# Patient Record
Sex: FEMALE | Race: WHITE | NOT HISPANIC OR LATINO | Employment: OTHER | URBAN - METROPOLITAN AREA
[De-identification: names, ages, dates, MRNs, and addresses within clinical notes are randomized per-mention and may not be internally consistent; named-entity substitution may affect disease eponyms.]

---

## 2023-12-29 ENCOUNTER — NON-PROVIDER VISIT (OUTPATIENT)
Dept: WOUND CARE | Facility: MEDICAL CENTER | Age: 78
End: 2023-12-29
Attending: FAMILY MEDICINE
Payer: MEDICARE

## 2023-12-29 PROCEDURE — 99211 OFF/OP EST MAY X REQ PHY/QHP: CPT | Mod: 25

## 2023-12-29 PROCEDURE — 97597 DBRDMT OPN WND 1ST 20 CM/<: CPT

## 2023-12-29 RX ORDER — CLINDAMYCIN HYDROCHLORIDE 300 MG/1
CAPSULE ORAL
COMMUNITY
Start: 2023-12-27 | End: 2024-01-15

## 2023-12-29 RX ORDER — OMEGA-3 FATTY ACIDS/FISH OIL 300-1000MG
CAPSULE ORAL
COMMUNITY
End: 2023-12-29

## 2023-12-29 RX ORDER — SILICONE ADHESIVE 1.5" X 3"
1 SHEET (EA) TOPICAL
COMMUNITY

## 2023-12-29 RX ORDER — DIPHENHYDRAMINE HCL 25 MG
CAPSULE ORAL
COMMUNITY

## 2023-12-29 RX ORDER — ETHACRYNIC ACID 25 MG/1
25 TABLET ORAL DAILY
COMMUNITY

## 2023-12-29 RX ORDER — DIGOXIN 125 MCG
1 TABLET ORAL
COMMUNITY

## 2023-12-29 RX ORDER — APIXABAN 5 MG/1
TABLET, FILM COATED ORAL
COMMUNITY

## 2023-12-29 NOTE — PATIENT INSTRUCTIONS
-Keep your wound dressing clean, dry, and intact.    -Change your dressing if it becomes soiled, soaked, or falls off.    -Remove your compression wrap if you have severe pain, severe swelling, numbness, color change, or temperature change in your toes. If you need to remove your compression wrap, do so by unrolling it. Do not cut the compression wrap off to prevent cutting yourself on accident.    -Should you experience any significant changes in your wound(s), such as infection (redness, swelling, localized heat, increased pain, fever > 101 F, chills) or have any questions regarding your home care instructions, please contact the wound center at [phone]. If after hours, contact your primary care physician or go to the hospital emergency room.

## 2023-12-29 NOTE — CERTIFICATION
Non Provider Encounter- Lower Extremity Ulcer    HISTORY OF PRESENT ILLNESS  Wound History:    START OF CARE IN CLINIC: 12/29/2023    REFERRING PROVIDER: Nils Frausto MD (PCP in [place] where pt lives with her )     WOUND ETIOLOGY: venous insufficiency   LOCATION: right medial heel/ankle   HISTORY: Wound appeared approx Oct 2023. Pt feels it was caused by her compression sock and shoe rubbing on the site. She has had prior LE wounds, however historically they have been on the left leg. Having one on the right leg is new for her. She is currently (as of 12/29/23) on a second course of Clindamycin (note: she has several abx allergies) and states the area has improved.    Pertinent Medical History: PVD, afib on eliquis.      ETIOLOGY HISTORY:   Diagnosed: 2010 or earlier.   Vascular Surgeon: name unknown.   Previous vascular procedures: vein treatment in approximately 2010.   Compression: Wears normally, per vascular surgeon. Has relatively new thigh high compression socks.  Varicose Veins: yes  History LE wounds: yes        TOBACCO USE: none      MOST RECENT VASCULAR STUDIES:   Not in Epic.     Culture:  Pt reports wound culture taken by PCP in [place]. No results available in Epic.       Patient allergies and medications reviewed via Epic.     WOUND ASSESSMENT     Wound 12/29/23 Ankle;Heel Medial Right (Active)   Wound Image    12/29/23 1000   Site Assessment Red;Pink;Yellow;White 12/29/23 1000   Periwound Assessment Hemosiderin Staining;Crusted;Edema 12/29/23 1000   Margins Undefined edges 12/29/23 1000   Closure Secondary intention 12/29/23 1000   Drainage Amount Moderate 12/29/23 1000   Drainage Description Serous 12/29/23 1000   Treatments Cleansed;Topical Lidocaine;CSWD - Conservative Sharp Wound Debridement 12/29/23 1000   Wound Cleansing Hypochlorus Acid 12/29/23 1000   Periwound Protectant Barrier Paste;Skin Moisturizer 12/29/23 1000   Dressing Changed New 12/29/23 1000   Dressing  Cleansing/Solutions Not Applicable 12/29/23 1000   Dressing Options Hydrofiber Silver x2;Super Absorbent Pad (convamax 4x4);Compression Wrap Two Layer; heel padded with 4x4 gauze x2 layers, per pt request. 12/29/23 1000   Dressing Change/Treatment Frequency Weekly, and As Needed 12/29/23 1000   Wound Team Following Weekly 12/29/23 1000   Wound Length (cm) 2.5 cm 12/29/23 1000   Wound Width (cm) 4.2 cm 12/29/23 1000   Wound Depth (cm) 0.1 cm 12/29/23 1000   Wound Surface Area (cm^2) 10.5 cm^2 12/29/23 1000   Wound Volume (cm^3) 1.05 cm^3 12/29/23 1000   Post-Procedure Length (cm) 2.2 cm 12/29/23 1000   Post-Procedure Width (cm) 3 cm 12/29/23 1000   Post-Procedure Depth (cm) 0.2 cm 12/29/23 1000   Post-Procedure Surface Area (cm^2) 6.6 cm^2 12/29/23 1000   Post-Procedure Volume (cm^3) 1.32 cm^3 12/29/23 1000   Tunneling (cm) 0 cm 12/29/23 1000   Undermining (cm) 0 cm 12/29/23 1000   Wound Odor None 12/29/23 1000   Pulses Right;Left;DP;PT;Biphasic per doppler. 12/29/23 1000   Exposed Structures None 12/29/23 1000        Procedures:      -2% viscous lidocaine applied topically to wound bed for >5 minutes prior to debridement  -Scalpel used to debride wound bed. Entire surface of wound, 5cm2 debrided.   -Refer to flowsheet for wound care details.       PATIENT EDUCATION:  - Etiology of venous ulceration discussed with patient  - Importance of managing edema for healing of ulcer, and for prevention of new ulcer development  -Need for lifelong compression of lower legs   -Elevate legs above the level of the heart periodically throughout the day.  - Importance of adequate nutrition for wound healing  -Increase protein intake (unless contraindicated by renal status)  -Discussed importance of smoking cessation.  -Advised to go to ER for any increased redness, swelling, drainage or odor, or if patient develops fever, chills, nausea or vomiting.

## 2024-01-05 ENCOUNTER — OFFICE VISIT (OUTPATIENT)
Dept: WOUND CARE | Facility: MEDICAL CENTER | Age: 79
End: 2024-01-05
Attending: FAMILY MEDICINE
Payer: MEDICARE

## 2024-01-05 VITALS
TEMPERATURE: 97.7 F | DIASTOLIC BLOOD PRESSURE: 70 MMHG | HEART RATE: 55 BPM | RESPIRATION RATE: 17 BRPM | OXYGEN SATURATION: 93 % | SYSTOLIC BLOOD PRESSURE: 118 MMHG

## 2024-01-05 DIAGNOSIS — I83.013 VENOUS STASIS ULCER OF RIGHT ANKLE WITH FAT LAYER EXPOSED WITH VARICOSE VEINS (HCC): ICD-10-CM

## 2024-01-05 DIAGNOSIS — R60.0 EDEMA OF BOTH LOWER LEGS: ICD-10-CM

## 2024-01-05 DIAGNOSIS — R52 PAIN ASSOCIATED WITH WOUND: ICD-10-CM

## 2024-01-05 DIAGNOSIS — T14.8XXA PAIN ASSOCIATED WITH WOUND: ICD-10-CM

## 2024-01-05 DIAGNOSIS — L97.312 VENOUS STASIS ULCER OF RIGHT ANKLE WITH FAT LAYER EXPOSED WITH VARICOSE VEINS (HCC): ICD-10-CM

## 2024-01-05 PROCEDURE — 99214 OFFICE O/P EST MOD 30 MIN: CPT

## 2024-01-05 PROCEDURE — 99213 OFFICE O/P EST LOW 20 MIN: CPT

## 2024-01-05 PROCEDURE — 99213 OFFICE O/P EST LOW 20 MIN: CPT | Mod: 25 | Performed by: NURSE PRACTITIONER

## 2024-01-05 PROCEDURE — 11042 DBRDMT SUBQ TIS 1ST 20SQCM/<: CPT

## 2024-01-05 PROCEDURE — 11042 DBRDMT SUBQ TIS 1ST 20SQCM/<: CPT | Performed by: NURSE PRACTITIONER

## 2024-01-05 PROCEDURE — 3078F DIAST BP <80 MM HG: CPT | Performed by: NURSE PRACTITIONER

## 2024-01-05 PROCEDURE — 3074F SYST BP LT 130 MM HG: CPT | Performed by: NURSE PRACTITIONER

## 2024-01-05 ASSESSMENT — ENCOUNTER SYMPTOMS
DIARRHEA: 0
CONSTIPATION: 0
COUGH: 0
VOMITING: 0
CLAUDICATION: 0
SHORTNESS OF BREATH: 0
NAUSEA: 0
FEVER: 0
CHILLS: 0

## 2024-01-05 NOTE — PROGRESS NOTES
Verse order for Solaris Ready Wrap:     Wound 12/29/23 Ankle;Heel Medial Right (Active)   Wound Image    01/05/24 0900   Site Assessment Pink;Red;Yellow;Crusted 01/05/24 0900   Periwound Assessment Hemosiderin Staining;Crusted;Edema 01/05/24 0900   Margins Undefined edges 01/05/24 0900   Closure Secondary intention 01/05/24 0900   Drainage Amount Moderate 01/05/24 0900   Drainage Description Serous 01/05/24 0900   Treatments Cleansed;Topical Lidocaine;Provider debridement;Site care 01/05/24 0900   Wound Cleansing Hypochlorus Acid 01/05/24 0900   Periwound Protectant Skin Moisturizer;Barrier Paste 01/05/24 0900   Dressing Changed Changed 01/05/24 0900   Dressing Cleansing/Solutions Not Applicable 01/05/24 0900   Dressing Options Hydrofiber Silver;Super Absorbent Pad;Compression Wrap Two Layer;Dry Gauze 01/05/24 0900   Dressing Change/Treatment Frequency Weekly, and As Needed 01/05/24 0900   Wound Team Following Weekly 01/05/24 0900   Wound Length (cm) 2.7 cm 01/05/24 0900   Wound Width (cm) 3 cm 01/05/24 0900   Wound Depth (cm) 0.1 cm 01/05/24 0900   Wound Surface Area (cm^2) 8.1 cm^2 01/05/24 0900   Wound Volume (cm^3) 0.81 cm^3 01/05/24 0900   Post-Procedure Length (cm) 3 cm 01/05/24 0900   Post-Procedure Width (cm) 3 cm 01/05/24 0900   Post-Procedure Depth (cm) 0.1 cm 01/05/24 0900   Post-Procedure Surface Area (cm^2) 9 cm^2 01/05/24 0900   Post-Procedure Volume (cm^3) 0.9 cm^3 01/05/24 0900   Wound Healing % 23 01/05/24 0900   Tunneling (cm) 0 cm 01/05/24 0900   Undermining (cm) 0 cm 01/05/24 0900   Wound Odor None 01/05/24 0900   Pulses Right;Left;DP;PT;Doppler;Other (Comments) 12/29/23 1000   Exposed Structures None 01/05/24 0900

## 2024-01-05 NOTE — PROGRESS NOTES
Provider Encounter- Lower Extremity Ulcer      HISTORY OF PRESENT ILLNESS  Wound History:    START OF CARE IN CLINIC: 12/29/2023               REFERRING PROVIDER: Nils Frausto MD                  WOUND ETIOLOGY: venous insufficiency              LOCATION: right medial heel/ankle              HISTORY: Patient with long history of CVI referred to the clinic for treatment of a right medial ankle ulcer.  This wound started in October of this year, patient feels this was caused by rubbing from her sock and shoes.  She has a history of similar ulcers to her left lower extremity.  She has undergone multiple previous venous procedures, including ablations, to both legs in the past, has been seen at [facility].   Her PCP prescribed 2 courses of clindamycin.           Pertinent Medical History: Chronic venous insufficiency, atrial fibrillation      ETIOLOGY HISTORY: Diagnosed unknown. Vascular Surgeon: Dr. Goldstein. Previous vascular procedures ablation procedures. Compression stockings 20 to 40 mmHg. Varicose Veins present        TOBACCO USE: Former smoker, quit in 1972    Patient's problem list, allergies, and current medications reviewed and updated in Epic    Interval History:  1/5/2024 : Initial provider visit with ADRIANA Chi, KIMBER-BC, CWMATEON, CFNILAY.  Patient is here today with her .  She states that overall she is feeling well.  Tolerating compression wrap without any difficulty.  Wound measures smaller today.  She has completed her antibiotics.      REVIEW OF SYSTEMS:   Review of Systems   Constitutional:  Negative for chills and fever.   Respiratory:  Negative for cough and shortness of breath.    Cardiovascular:  Positive for leg swelling. Negative for chest pain and claudication.        Problems with swelling in her legs for many years   Gastrointestinal:  Negative for constipation, diarrhea, nausea and vomiting.   Genitourinary:  Negative for dysuria.         PHYSICAL EXAMINATION:   /70    Pulse (!) 55   Temp 36.5 °C (97.7 °F) (Temporal)   Resp 17   SpO2 93%     Physical Exam  Cardiovascular:      Rate and Rhythm: Bradycardia present.      Comments: Right pedal pulses difficult to palpate, biphasic by Doppler  Pulmonary:      Effort: Pulmonary effort is normal.   Musculoskeletal:      Right lower leg: Edema present.      Left lower leg: Edema present.      Comments: Compression stocking to left lower extremity  Edema to right lower extremity 1+, well-controlled with compression wrap   Skin:     Comments: Full-thickness ulcer to right medial ankle-edges diffuse, total wound area has decreased since last assessment, moderate serosanguineous drainage, no evidence of infection   Neurological:      Mental Status: She is alert and oriented to person, place, and time.   Psychiatric:         Mood and Affect: Mood normal.         WOUND ASSESSMENT  Wound 12/29/23 Ankle;Heel Medial Right (Active)   Wound Image    01/05/24 0900   Site Assessment Pink;Red;Yellow;Crusted 01/05/24 0900   Periwound Assessment Hemosiderin Staining;Crusted;Edema 01/05/24 0900   Margins Undefined edges 01/05/24 0900   Closure Secondary intention 01/05/24 0900   Drainage Amount Moderate 01/05/24 0900   Drainage Description Serous 01/05/24 0900   Treatments Cleansed;Topical Lidocaine;Provider debridement;Site care 01/05/24 0900   Wound Cleansing Hypochlorus Acid 01/05/24 0900   Periwound Protectant Skin Moisturizer;Barrier Paste 01/05/24 0900   Dressing Changed Changed 01/05/24 0900   Dressing Cleansing/Solutions Not Applicable 01/05/24 0900   Dressing Options Hydrofiber Silver;Super Absorbent Pad;Compression Wrap Two Layer;Dry Gauze 01/05/24 0900   Dressing Change/Treatment Frequency Weekly, and As Needed 01/05/24 0900   Wound Team Following Weekly 01/05/24 0900   Wound Length (cm) 2.7 cm 01/05/24 0900   Wound Width (cm) 3 cm 01/05/24 0900   Wound Depth (cm) 0.1 cm 01/05/24 0900   Wound Surface Area (cm^2) 8.1 cm^2 01/05/24 0900  "  Wound Volume (cm^3) 0.81 cm^3 01/05/24 0900   Post-Procedure Length (cm) 3 cm 01/05/24 0900   Post-Procedure Width (cm) 3 cm 01/05/24 0900   Post-Procedure Depth (cm) 0.1 cm 01/05/24 0900   Post-Procedure Surface Area (cm^2) 9 cm^2 01/05/24 0900   Post-Procedure Volume (cm^3) 0.9 cm^3 01/05/24 0900   Wound Healing % 23 01/05/24 0900   Tunneling (cm) 0 cm 01/05/24 0900   Undermining (cm) 0 cm 01/05/24 0900   Wound Odor None 01/05/24 0900   Pulses Right;Left;DP;PT;Doppler;Other (Comments) 12/29/23 1000   Exposed Structures None 01/05/24 0900   Number of days: 7           PROCEDURE:   -2% viscous lidocaine applied topically to wound bed for approximately 5 minutes prior to debridement  -Curette used to excise nonviable tissue from wound bed.  Excisional debridement was performed to remove devitalized tissue until healthy, bleeding tissue was visualized.   Entire surface of wound, 9.0 cm² debrided.  Tissue debrided into the subcutaneous layer.    -Bleeding controlled with manual pressure.   -Wound care completed by wound RN, refer to wound flowsheet   -Patient tolerated the procedure well, without c/o of significant pain or discomfort.       Pertinent Labs and Diagnostics:    Labs:   A1c:   Lab Results   Component Value Date/Time    HBA1C 5.9 (H) 04/26/2023 09:34 AM            IMAGING: None found in epic    VASCULAR STUDIES: None found in epic  No results found.    LAST  WOUND CULTURE:  DATE :  No results found for: \"CULTRSULT\"           ASSESSMENT AND PLAN:     1. Venous stasis ulcer of right ankle with fat layer exposed with varicose veins (HCC)    1/5/2024: Initial provider visit.  Wound area has decreased in size assessment.  Patient tolerating compression wrap.  -Excisional debridement of wound in clinic today, medically necessary to promote wound healing.  -Patient to return to clinic weekly for assessment and debridement  -Patient to keep compression wrap dry in between clinic visits   -Patient measured for " Solaris wrap today.  Appropriate for long-term management of edema.  Patient has had trouble applying and removing compression socks.    Wound care: Silver Hydrofiber to manage exudate and bioburden, ABD pad, 2 layer compression      2. Pain associated with wound    1/5/2024: Patient was able to tolerate significant debridement today with use of topical anesthesia  -2% viscous lidocaine applied topically to wound bed for approximately 5 minutes prior to debridement  -Patient tolerated procedure today with no complaints of discomfort.     3. Edema of both lower legs    1/5/2024: Patient's right lower extremity edema is well-controlled with 2 layer compression wrap.  She states that in the past she has had trouble with compression stockings to this leg, difficulty applying and removing.  -Continue with compression wrap for now  -Patient measured for Solaris wrap, instructed to bring with her to clinic when she receives it to be shown how to apply properly.          PATIENT EDUCATION  - Etiology of  venous stasis ulceration discussed with patient  - Importance of managing edema for healing of ulcer, and for prevention of new ulcer development  -Need for lifelong compression of lower legs   -Elevate legs above the level of the heart periodically throughout the day.  - Importance of adequate nutrition for wound healing  -Advised to go to ER for any increased redness, swelling, drainage or odor, or if patient develops fever, chills, nausea or vomiting.      My total time spent caring for the patient on the day of the encounter was 20 minutes.   This does not include time spent on separately billable procedures/tests.       Please note that this note may have been created using voice recognition software. I have worked with technical experts [facility] to optimize the interface.  I have made every reasonable attempt to correct obvious errors, but there may be errors of grammar and possibly     N

## 2024-01-05 NOTE — PATIENT INSTRUCTIONS
-Keep your wound dressing clean, dry, and intact.    -Change your dressing if it becomes soiled, soaked, or falls off.    -Remove your compression wrap if you have severe pain, severe swelling, numbness, color change, or temperature change in your toes. If you need to remove your compression wrap, do so by unrolling it. Do not cut the compression wrap off to prevent cutting yourself on accident.    -Should you experience any significant changes in your wound(s), such as infection (redness, swelling, localized heat, increased pain, fever > 101 F, chills) or have any questions regarding your home care instructions, please contact the wound center at [phone]. If after hours, contact your primary care physician or go to the hospital emergency room.

## 2024-01-05 NOTE — PROGRESS NOTES
[facility]olaris [facility]

## 2024-01-15 ENCOUNTER — OFFICE VISIT (OUTPATIENT)
Dept: WOUND CARE | Facility: MEDICAL CENTER | Age: 79
End: 2024-01-15
Attending: FAMILY MEDICINE
Payer: MEDICARE

## 2024-01-15 VITALS
SYSTOLIC BLOOD PRESSURE: 145 MMHG | RESPIRATION RATE: 16 BRPM | HEART RATE: 67 BPM | DIASTOLIC BLOOD PRESSURE: 90 MMHG | OXYGEN SATURATION: 96 % | TEMPERATURE: 96.6 F

## 2024-01-15 DIAGNOSIS — L97.312 VENOUS STASIS ULCER OF RIGHT ANKLE WITH FAT LAYER EXPOSED WITH VARICOSE VEINS (HCC): ICD-10-CM

## 2024-01-15 DIAGNOSIS — R52 PAIN ASSOCIATED WITH WOUND: ICD-10-CM

## 2024-01-15 DIAGNOSIS — T14.8XXA PAIN ASSOCIATED WITH WOUND: ICD-10-CM

## 2024-01-15 DIAGNOSIS — R60.0 EDEMA OF BOTH LOWER LEGS: ICD-10-CM

## 2024-01-15 DIAGNOSIS — B35.1 MYCOTIC TOENAILS: ICD-10-CM

## 2024-01-15 DIAGNOSIS — M20.42 HAMMERTOES OF BOTH FEET: ICD-10-CM

## 2024-01-15 DIAGNOSIS — M20.41 HAMMERTOES OF BOTH FEET: ICD-10-CM

## 2024-01-15 DIAGNOSIS — I83.013 VENOUS STASIS ULCER OF RIGHT ANKLE WITH FAT LAYER EXPOSED WITH VARICOSE VEINS (HCC): ICD-10-CM

## 2024-01-15 PROCEDURE — 3080F DIAST BP >= 90 MM HG: CPT | Performed by: NURSE PRACTITIONER

## 2024-01-15 PROCEDURE — 3077F SYST BP >= 140 MM HG: CPT | Performed by: NURSE PRACTITIONER

## 2024-01-15 PROCEDURE — 11042 DBRDMT SUBQ TIS 1ST 20SQCM/<: CPT | Performed by: NURSE PRACTITIONER

## 2024-01-15 PROCEDURE — 99213 OFFICE O/P EST LOW 20 MIN: CPT | Mod: 25 | Performed by: NURSE PRACTITIONER

## 2024-01-15 PROCEDURE — 11042 DBRDMT SUBQ TIS 1ST 20SQCM/<: CPT

## 2024-01-15 PROCEDURE — 99213 OFFICE O/P EST LOW 20 MIN: CPT | Mod: 25

## 2024-01-15 NOTE — PROGRESS NOTES
Patient has not received her Solaris Wrap from [employer]. Inquired for an update from [employer] and this is what was received:     Hello. We received your request for an update for Vanessa's order. We apologize that the tracking wasn't updated on this order, but that oversight has been corrected. The package is scheduled to arrive on Wednesday. Delivery for this order may be taking longer than usual because the patient has a [address] address. Please let us know if you have any other questions.    Patient was advised to bring in to clinic next visit so that we may show her how to apply.

## 2024-01-15 NOTE — PROGRESS NOTES
Provider Encounter- Lower Extremity Ulcer      HISTORY OF PRESENT ILLNESS  Wound History:    START OF CARE IN CLINIC: 12/29/2023               REFERRING PROVIDER: Nils Frausto MD                  WOUND ETIOLOGY: venous insufficiency              LOCATION: right medial heel/ankle              HISTORY: Patient with long history of CVI referred to the clinic for treatment of a right medial ankle ulcer.  This wound started in October of this year, patient feels this was caused by rubbing from her sock and shoes.  She has a history of similar ulcers to her left lower extremity.  She has undergone multiple previous venous procedures, including ablations, to both legs in the past, has been seen at [facility].   Her PCP prescribed 2 courses of clindamycin.           Pertinent Medical History: Chronic venous insufficiency, atrial fibrillation      ETIOLOGY HISTORY: Diagnosed unknown. Vascular Surgeon: Dr. Goldstein. Previous vascular procedures ablation procedures. Compression stockings 20 to 40 mmHg. Varicose Veins present        TOBACCO USE: Former smoker, quit in 1972    Patient's problem list, allergies, and current medications reviewed and updated in Epic    Interval History:  1/5/2024 : Initial provider visit with ADRIANA Chi, RYAN, MARCIA, JUANPABLO.  Patient is here today with her .  She states that overall she is feeling well.  Tolerating compression wrap without any difficulty.  Wound measures smaller today.  She has completed her antibiotics.      1/15/2024: Clinic visit with Claudia WRIGHT, RYAN, WILVER, CFNILAY.  Pt denies fevers, chills, nausea, vomiting.  Has not received Solaris wrap for right leg.  Followed [facility].  Has had history of ablation to right thigh and knee but never to right ankle.  Ulcer has decreased in size to right medial ankle.  Hammertoes bilaterally with mycotic overgrown toenails with callusing.  Referral to podiatry placed.  Discussed with patient  following up with local orthotist for custom inserts.  Patient does not have diabetes.      REVIEW OF SYSTEMS:   Unchanged from previous wound clinic assessment on 1/5/24, except as noted in interval history above        PHYSICAL EXAMINATION:   BP (!) 145/90   Pulse 67   Temp 35.9 °C (96.6 °F) (Temporal)   Resp 16   SpO2 96%     Physical Exam  Cardiovascular:      Rate and Rhythm: Bradycardia present.      Comments: Right pedal pulses difficult to palpate, biphasic by Doppler  Pulmonary:      Effort: Pulmonary effort is normal.   Musculoskeletal:      Right lower leg: Edema present.      Left lower leg: Edema present.      Comments: Compression stocking to left lower extremity  Edema to right lower extremity 1+, well-controlled with compression wrap   Skin:     Comments: Full-thickness ulcer to right medial ankle-edges diffuse, total wound area has decreased since last assessment, moderate serosanguineous drainage, no evidence of infection   Neurological:      Mental Status: She is alert and oriented to person, place, and time.   Psychiatric:         Mood and Affect: Mood normal.         WOUND ASSESSMENT  Wound 12/29/23 Ankle;Heel Medial Right (Active)   Wound Image    01/15/24 1030   Site Assessment Pink;Red;Crusted 01/15/24 1030   Periwound Assessment Hemosiderin Staining;Crusted;Edema 01/15/24 1030   Margins Undefined edges 01/15/24 1030   Closure Secondary intention 01/05/24 0900   Drainage Amount Small 01/15/24 1030   Drainage Description Serosanguineous 01/15/24 1030   Treatments Topical Lidocaine;Cleansed;Site care;Provider debridement 01/15/24 1030   Wound Cleansing Hypochlorus Acid 01/15/24 1030   Periwound Protectant Skin Moisturizer;Barrier Paste 01/15/24 1030   Dressing Changed Changed 01/15/24 1030   Dressing Cleansing/Solutions Not Applicable 01/15/24 1030   Dressing Options Hydrofiber Silver;Nonadhesive Foam;Compression Wrap Two Layer;Dry Gauze 01/15/24 1030   Dressing Change/Treatment Frequency  "Weekly, and As Needed 01/15/24 1030   Wound Team Following Weekly 01/15/24 1030   Wound Length (cm) 0.5 cm 01/15/24 1030   Wound Width (cm) 2 cm 01/15/24 1030   Wound Depth (cm) 0.1 cm 01/15/24 1030   Wound Surface Area (cm^2) 1 cm^2 01/15/24 1030   Wound Volume (cm^3) 0.1 cm^3 01/15/24 1030   Post-Procedure Length (cm) 0.5 cm 01/15/24 1030   Post-Procedure Width (cm) 2.4 cm 01/15/24 1030   Post-Procedure Depth (cm) 0.2 cm 01/15/24 1030   Post-Procedure Surface Area (cm^2) 1.2 cm^2 01/15/24 1030   Post-Procedure Volume (cm^3) 0.24 cm^3 01/15/24 1030   Wound Healing % 90 01/15/24 1030   Tunneling (cm) 0 cm 01/15/24 1030   Undermining (cm) 0 cm 01/15/24 1030   Wound Odor None 01/15/24 1030   Pulses Right;Left;DP;PT;Doppler;Other (Comments) 12/29/23 1000   Exposed Structures None 01/15/24 1030   Number of days: 20           PROCEDURE:   -2% viscous lidocaine applied topically to wound bed for approximately 5 minutes prior to debridement  -Curette used to excise nonviable tissue from wound bed.  Excisional debridement was performed to remove devitalized tissue until healthy, bleeding tissue was visualized.   Entire surface of wound, 1.2 cm² debrided.  Tissue debrided into the subcutaneous layer.    -Bleeding controlled with manual pressure.   -Wound care completed by wound RN, refer to wound flowsheet   -Patient tolerated the procedure well, without c/o of significant pain or discomfort.       Pertinent Labs and Diagnostics:    Labs:   A1c:   Lab Results   Component Value Date/Time    HBA1C 5.9 (H) 04/26/2023 09:34 AM            IMAGING: None found in epic    VASCULAR STUDIES: None found in epic  No results found.    LAST  WOUND CULTURE:  DATE :  No results found for: \"CULTRSULT\"           ASSESSMENT AND PLAN:     1. Venous stasis ulcer of right ankle with fat layer exposed with varicose veins (HCC)    1/15/2024:   Wound area has decreased in size.  Patient tolerating compression wrap.  -Excisional debridement of wound " in clinic today, medically necessary to promote wound healing.  -Patient to return to clinic weekly for assessment and debridement  -Patient to keep compression wrap dry in between clinic visits   -Patient has not received Solaris ready wrap.  RN followed up and reports that wrap is in route to be delivered to home.  Appropriate for long-term management of edema.  Patient has had trouble applying and removing compression socks.    Wound care: Silver Hydrofiber to manage exudate and bioburden, ABD pad, 2 layer compression      2. Pain associated with wound    1 /15/24: Patient was able to tolerate significant debridement today with use of topical anesthesia  -2% viscous lidocaine applied topically to wound bed for approximately 5 minutes prior to debridement  -Patient tolerated procedure today with no complaints of discomfort.     3. Edema of both lower legs    1/15/2024: Patient's right lower extremity edema is well-controlled with 2 layer compression wrap.  She states that in the past she has had trouble with compression stockings to this leg, difficulty applying and removing.  -Continue with compression wrap for now  -Solaris wrap to be delivered to home soon, instructed to bring with her to clinic when she receives it to be shown how to apply properly.    4.  Mycotic toenails  Hammertoes of both feet  1/15/2024: Referral to podiatry placed  - Patient encouraged to follow-up with /orthotist for custom inserts.  Unfortunately insurance will not cover as patient does not have diabetes.          PATIENT EDUCATION  - Etiology of  venous stasis ulceration discussed with patient  - Importance of managing edema for healing of ulcer, and for prevention of new ulcer development  -Need for lifelong compression of lower legs   -Elevate legs above the level of the heart periodically throughout the day.  - Importance of adequate nutrition for wound healing  -Advised to go to ER for any increased redness, swelling,  drainage or odor, or if patient develops fever, chills, nausea or vomiting.      My total time spent caring for the patient on the day of the encounter was 20 minutes.   This does not include time spent on separately billable procedures/tests.       Please note that this note may have been created using voice recognition software. I have worked with technical experts from [employer] [facility] to optimize the interface.  I have made every reasonable attempt to correct obvious errors, but there may be errors of grammar and possibly     N

## 2024-01-15 NOTE — PATIENT INSTRUCTIONS
-Keep your wound dressing clean, dry, and intact.    -Change your dressing if it becomes soiled, soaked, or falls off.    -Remove your compression wrap if you have severe pain, severe swelling, numbness, color change, or temperature change in your toes. If you need to remove your compression wrap, do so by unrolling it. Do not cut the compression wrap off to prevent cutting yourself on accident.    -Should you experience any significant changes in your wound(s), such as infection (redness, swelling, localized heat, increased pain, fever > 101 F, chills) or have any questions regarding your home care instructions, please contact the wound center at [phone]. If after hours, contact your primary care physician or go to the hospital emergency room.

## 2024-01-22 ENCOUNTER — OFFICE VISIT (OUTPATIENT)
Dept: WOUND CARE | Facility: MEDICAL CENTER | Age: 79
End: 2024-01-22
Attending: FAMILY MEDICINE
Payer: MEDICARE

## 2024-01-22 VITALS
RESPIRATION RATE: 18 BRPM | HEART RATE: 51 BPM | DIASTOLIC BLOOD PRESSURE: 75 MMHG | SYSTOLIC BLOOD PRESSURE: 118 MMHG | OXYGEN SATURATION: 93 % | TEMPERATURE: 96.8 F

## 2024-01-22 DIAGNOSIS — R52 PAIN ASSOCIATED WITH WOUND: ICD-10-CM

## 2024-01-22 DIAGNOSIS — L97.312 VENOUS STASIS ULCER OF RIGHT ANKLE WITH FAT LAYER EXPOSED WITH VARICOSE VEINS (HCC): ICD-10-CM

## 2024-01-22 DIAGNOSIS — I83.013 VENOUS STASIS ULCER OF RIGHT ANKLE WITH FAT LAYER EXPOSED WITH VARICOSE VEINS (HCC): ICD-10-CM

## 2024-01-22 DIAGNOSIS — T14.8XXA PAIN ASSOCIATED WITH WOUND: ICD-10-CM

## 2024-01-22 DIAGNOSIS — R60.0 EDEMA OF BOTH LOWER LEGS: ICD-10-CM

## 2024-01-22 DIAGNOSIS — M20.42 HAMMERTOES OF BOTH FEET: ICD-10-CM

## 2024-01-22 DIAGNOSIS — M20.41 HAMMERTOES OF BOTH FEET: ICD-10-CM

## 2024-01-22 DIAGNOSIS — B35.1 MYCOTIC TOENAILS: ICD-10-CM

## 2024-01-22 PROCEDURE — 11042 DBRDMT SUBQ TIS 1ST 20SQCM/<: CPT

## 2024-01-22 PROCEDURE — 3074F SYST BP LT 130 MM HG: CPT | Performed by: NURSE PRACTITIONER

## 2024-01-22 PROCEDURE — 3078F DIAST BP <80 MM HG: CPT | Performed by: NURSE PRACTITIONER

## 2024-01-22 PROCEDURE — 11042 DBRDMT SUBQ TIS 1ST 20SQCM/<: CPT | Performed by: NURSE PRACTITIONER

## 2024-01-22 NOTE — PROGRESS NOTES
Order sent to [facility] for supplies. Showed patient and  how to apply the Solaris wrap. Questions and concerns addressed and answered.

## 2024-01-22 NOTE — PATIENT INSTRUCTIONS
-Keep your wound dressing clean, dry, and intact.    -Change your dressing if it becomes soiled, soaked, or falls off.    -Should you experience any significant changes in your wound(s), such as infection (redness, swelling, localized heat, increased pain, fever > 101 F, chills) or have any questions regarding your home care instructions, please contact the wound center at [phone]. If after hours, contact your primary care physician or go to the hospital emergency room.

## 2024-01-22 NOTE — PROGRESS NOTES
Advanced Wound Care   [facility]   [address]   [address]   [place] [address]   [phone] Fax: [phone]        Duration of Supply Order: 30 Days  Dispense as written.     Wound 12/29/23 Ankle;Heel Medial Right (Active)   Wound Image    01/22/24 1030   Site Assessment Pink;Epithelialization;Painful 01/22/24 1030   Periwound Assessment Hemosiderin Staining;Crusted;Edema;Painful 01/22/24 1030   Margins Undefined edges 01/22/24 1030   Closure Secondary intention 01/05/24 0900   Drainage Amount Small 01/22/24 1030   Drainage Description Serosanguineous 01/22/24 1030   Treatments Topical Lidocaine;Cleansed;Site care;Provider debridement 01/22/24 1030   Wound Cleansing Hypochlorus Acid 01/22/24 1030   Periwound Protectant Skin Moisturizer 01/22/24 1030   Dressing Changed Changed 01/22/24 1030   Dressing Cleansing/Solutions Not Applicable 01/22/24 1030   Dressing Options Hydrofiber Silver;Silicone Adhesive Foam 01/22/24 1030   Dressing Change/Treatment Frequency Weekly, and As Needed 01/22/24 1030   Wound Team Following Weekly 01/22/24 1030   Wound Length (cm) 0.5 cm 01/22/24 1030   Wound Width (cm) 0.5 cm 01/22/24 1030   Wound Depth (cm) 0.1 cm 01/22/24 1030   Wound Surface Area (cm^2) 0.25 cm^2 01/22/24 1030   Wound Volume (cm^3) 0.025 cm^3 01/22/24 1030   Post-Procedure Length (cm) 0.7 cm 01/22/24 1030   Post-Procedure Width (cm) 2.3 cm 01/22/24 1030   Post-Procedure Depth (cm) 0.1 cm 01/22/24 1030   Post-Procedure Surface Area (cm^2) 1.61 cm^2 01/22/24 1030   Post-Procedure Volume (cm^3) 0.161 cm^3 01/22/24 1030   Wound Healing % 98 01/22/24 1030   Tunneling (cm) 0 cm 01/22/24 1030   Undermining (cm) 0 cm 01/22/24 1030   Wound Odor None 01/22/24 1030   Pulses Right;Left;DP;PT;Doppler;Other (Comments) 12/29/23 1000   Exposed Structures None 01/22/24 1030       PROCEDURE: CSWD using curette to remove ~1 to 2cm2 nonviable tissue from **location** wound bed. 2% topical  Lidocaine applied prior to debridement with ~5 minute dwell time. Patient tolerated well; denied pain.    I agree with current plan of care.    SIGNATURE:_______________________________________ DATE:________________    PROVIDER NAME:__________________________________

## 2024-01-22 NOTE — PROGRESS NOTES
Provider Encounter- Lower Extremity Ulcer      HISTORY OF PRESENT ILLNESS  Wound History:    START OF CARE IN CLINIC: 12/29/2023               REFERRING PROVIDER: Nils Frausto MD                  WOUND ETIOLOGY: venous insufficiency              LOCATION: right medial heel/ankle              HISTORY: Patient with long history of CVI referred to the clinic for treatment of a right medial ankle ulcer.  This wound started in October of this year, patient feels this was caused by rubbing from her sock and shoes.  She has a history of similar ulcers to her left lower extremity.  She has undergone multiple previous venous procedures, including ablations, to both legs in the past, has been seen at [facility].   Her PCP prescribed 2 courses of clindamycin.           Pertinent Medical History: Chronic venous insufficiency, atrial fibrillation      ETIOLOGY HISTORY: Diagnosed unknown. Vascular Surgeon: Dr. Goldstein. Previous vascular procedures ablation procedures. Compression stockings 20 to 40 mmHg. Varicose Veins present        TOBACCO USE: Former smoker, quit in 1972    Patient's problem list, allergies, and current medications reviewed and updated in Epic    Interval History:  1/5/2024 : Initial provider visit with ADRIANA Chi, RYAN, MARCIA, JUANPABLO.  Patient is here today with her .  She states that overall she is feeling well.  Tolerating compression wrap without any difficulty.  Wound measures smaller today.  She has completed her antibiotics.      1/15/2024: Clinic visit with Claudia WRIGHT, RYAN, WILVER, CFNILAY.  Pt denies fevers, chills, nausea, vomiting.  Has not received Solaris wrap for right leg.  Followed by [facility].  Has had history of ablation to right thigh and knee but never to right ankle.  Ulcer has decreased in size to right medial ankle.  Hammertoes bilaterally with mycotic overgrown toenails with callusing.  Referral to podiatry placed.  Discussed with patient  following up with local orthotist for custom inserts.  Patient does not have diabetes.    1/22/2024:  Clinic visit with Claudia WRIGHT, KIMBER-BC, WILVER, CFNILAY.  Pt denies fevers, chills, nausea, vomiting.  Accompanied by .  Ulcer has decreased in size.  Solaris wrap at bedside.  Will transition patient into Solaris compression wrap today.  Toenails have been trimmed in the meantime by .  Reported ladi to right third toe, no ulceration today.  There is thin scab.  Podiatry referral was placed last visit.  Patient has not had a chance to follow-up with orthotic company.   local orthotic clinic information given to patient.  Patient additionally has Achilles tightness.  Recommend PT.  Patient/family would like to discuss this further prior to referral placement.      REVIEW OF SYSTEMS:   Unchanged from previous wound clinic assessment on 1/15/24, except as noted in interval history above        PHYSICAL EXAMINATION:   /75   Pulse (!) 51 Comment: RN notified  Temp 36 °C (96.8 °F) (Temporal)   Resp 18   SpO2 93%     Physical Exam  Cardiovascular:      Rate and Rhythm: Bradycardia present.      Comments: Right pedal pulses difficult to palpate, biphasic by Doppler  Pulmonary:      Effort: Pulmonary effort is normal.   Musculoskeletal:      Right lower leg: Edema present.      Left lower leg: Edema present.      Comments: Compression stocking to left lower extremity  Edema to right lower extremity 1+, well-controlled with compression wrap   Skin:     Comments: Full-thickness ulcer to right medial ankle-edges diffuse, total wound area has decreased since last assessment and has evolved into 3 smaller ulcers, minimal serosanguineous drainage, thick periwound callus extending into lateral heel, no evidence of infection   Neurological:      Mental Status: She is alert and oriented to person, place, and time.   Psychiatric:         Mood and Affect: Mood normal.         WOUND ASSESSMENT  Wound  12/29/23 Ankle;Heel Medial Right (Active)   Wound Image    01/22/24 1030   Site Assessment Pink;Epithelialization;Painful 01/22/24 1030   Periwound Assessment Hemosiderin Staining;Crusted;Edema;Painful 01/22/24 1030   Margins Undefined edges 01/22/24 1030   Closure Secondary intention 01/05/24 0900   Drainage Amount Small 01/22/24 1030   Drainage Description Serosanguineous 01/22/24 1030   Treatments Topical Lidocaine;Cleansed;Site care;Provider debridement 01/22/24 1030   Wound Cleansing Hypochlorus Acid 01/22/24 1030   Periwound Protectant Skin Moisturizer 01/22/24 1030   Dressing Changed Changed 01/22/24 1030   Dressing Cleansing/Solutions Not Applicable 01/22/24 1030   Dressing Options Hydrofiber Silver;Silicone Adhesive Foam 01/22/24 1030   Dressing Change/Treatment Frequency Weekly, and As Needed 01/22/24 1030   Wound Team Following Weekly 01/22/24 1030   Wound Length (cm) 0.5 cm 01/22/24 1030   Wound Width (cm) 0.5 cm 01/22/24 1030   Wound Depth (cm) 0.1 cm 01/22/24 1030   Wound Surface Area (cm^2) 0.25 cm^2 01/22/24 1030   Wound Volume (cm^3) 0.025 cm^3 01/22/24 1030   Post-Procedure Length (cm) 0.7 cm 01/22/24 1030   Post-Procedure Width (cm) 2.3 cm 01/22/24 1030   Post-Procedure Depth (cm) 0.1 cm 01/22/24 1030   Post-Procedure Surface Area (cm^2) 1.61 cm^2 01/22/24 1030   Post-Procedure Volume (cm^3) 0.161 cm^3 01/22/24 1030   Wound Healing % 98 01/22/24 1030   Tunneling (cm) 0 cm 01/22/24 1030   Undermining (cm) 0 cm 01/22/24 1030   Wound Odor None 01/22/24 1030   Pulses Right;Left;DP;PT;Doppler;Other (Comments) 12/29/23 1000   Exposed Structures None 01/22/24 1030   Number of days: 24           PROCEDURE:   -2% viscous lidocaine applied topically to wound bed for approximately 5 minutes prior to debridement  -Curette used to excise nonviable tissue from wound bed.  Excisional debridement was performed to remove devitalized tissue until healthy, bleeding tissue was visualized.   Entire surface of wound,  "1.61cm² debrided.  Tissue debrided into the subcutaneous layer.    -Bleeding controlled with manual pressure.   -Wound care completed by wound RN, refer to wound flowsheet   -Patient tolerated the procedure well, without c/o of significant pain or discomfort.       Pertinent Labs and Diagnostics:    Labs:   A1c:   Lab Results   Component Value Date/Time    HBA1C 5.9 (H) 04/26/2023 09:34 AM            IMAGING: None found in epic    VASCULAR STUDIES: None found in epic  No results found.    LAST  WOUND CULTURE:  DATE :  No results found for: \"CULTRSULT\"           ASSESSMENT AND PLAN:     1. Venous stasis ulcer of right ankle with fat layer exposed with varicose veins (HCC)    1/22/2024:   Wound area has decreased in size into 3 smaller ulcers.  Patient tolerating compression wrap.  Solaris wrap at bedside, transition into Solaris compression garment today.  -Excisional debridement of wound in clinic today, medically necessary to promote wound healing.  -Patient to return to clinic weekly for assessment and debridement  -Patient to keep compression  dry in between clinic visits       Wound care: Silver Hydrofiber to manage exudate and bioburden, adhesive foam, dimethicone lotion to CHOLO skin, Solaris compression garment,     2. Pain associated with wound    1 /22/24: Improved  Patient was able to tolerate significant debridement today with use of topical anesthesia  -2% viscous lidocaine applied topically to wound bed for approximately 5 minutes prior to debridement  -Patient tolerated procedure today with no complaints of discomfort.     3. Edema of both lower legs    1/22/2024: Patient's right lower extremity edema is well-controlled with 2 layer compression wrap.  She states that in the past she has had trouble with compression stockings to this leg, difficulty applying and removing.  -Solaris wrap today.    4.  Mycotic toenails  Hammertoes of both feet  1/22/2024: Referral to podiatry placed last visit.  Has not " received phone call yet.  Phone number given to patient to call and schedule  - Patient encouraged to follow-up with /orthotist for custom inserts.  Unfortunately insurance will not cover as patient does not have diabetes.  Orthotic clinics information given to patient.  -Achilles tightening bilaterally.  Discussed PT referral for strength and stretching.  Patient/ to discuss further at home.  Discussed the importance of daily therapy        PATIENT EDUCATION  - Etiology of  venous stasis ulceration discussed with patient  - Importance of managing edema for healing of ulcer, and for prevention of new ulcer development  -Need for lifelong compression of lower legs   -Elevate legs above the level of the heart periodically throughout the day.  - Importance of adequate nutrition for wound healing  -Advised to go to ER for any increased redness, swelling, drainage or odor, or if patient develops fever, chills, nausea or vomiting.            Please note that this note may have been created using voice recognition software. I have worked with technical experts from [employer] to optimize the interface.  I have made every reasonable attempt to correct obvious errors, but there may be errors of grammar and possibly     N

## 2024-02-02 ENCOUNTER — NON-PROVIDER VISIT (OUTPATIENT)
Dept: WOUND CARE | Facility: MEDICAL CENTER | Age: 79
End: 2024-02-02
Attending: FAMILY MEDICINE
Payer: MEDICARE

## 2024-02-02 PROCEDURE — 97597 DBRDMT OPN WND 1ST 20 CM/<: CPT

## 2024-02-02 NOTE — PROGRESS NOTES
2% viscous lidocaine for ~5 minute dwell time. CSWD with curette to remove <2 cm2 non viable tissue from wound bed. Patient tolerated well.

## 2024-02-02 NOTE — PATIENT INSTRUCTIONS
Adult Behavioral Health Plan of Care  Patient-Specific Goal (Individualization)  Description  Patient will maintain daily ADLs without prompting.  Patient will attend >50% of groups while on the unit.   Patient will agree to treatment team recommendations for med changes and aftercare treatment.   If pt states anxiety 1-5 he is to receive 50 mg Atarax, if pt states anxiety 5-10 he is to receive Zyprexa 10 mg per Jocelyne.    1/3/2019 0934 - Adequate for Discharge by Magali Howard RN  Note  Pt up in lounge, compliant with all AM medications.  PRN Zyprexa admin at 0830 for anxiety 9/10 on 1-10 scale.  Reports continued depression and anxiety, denies SI/HI, pain. Pt uses a CPAP at night, with 1:1 staff present for safety.  Pt agrees with discharge plan, waiting in lounge for discharge.  No groups attended this shift.     Suicide Risk  Absence of Self-Harm  Description  Patient will remain free from self harm while on unit.   Patient will report SI is manageable by discharge.    1/3/2019 0934 - Adequate for Discharge by Magali Howard RN  Note  Denies SI will remain free from self harm      -Keep your wound dressing clean, dry, and intact.    -Change your dressing if it becomes soiled, soaked, or falls off.    -Should you experience any significant changes in your wound(s), such as infection (redness, swelling, localized heat, increased pain, fever > 101 F, chills) or have any questions regarding your home care instructions, please contact the wound center at [phone]. If after hours, contact your primary care physician or go to the hospital emergency room.

## 2024-02-09 ENCOUNTER — NON-PROVIDER VISIT (OUTPATIENT)
Dept: WOUND CARE | Facility: MEDICAL CENTER | Age: 79
End: 2024-02-09
Attending: FAMILY MEDICINE
Payer: MEDICARE

## 2024-02-09 PROCEDURE — 97597 DBRDMT OPN WND 1ST 20 CM/<: CPT

## 2024-02-09 NOTE — PROGRESS NOTES
2% viscous lidocaine for ~5 minute dwell time. CSWD with scalpel to remove <0.5 cm2 non viable tissue/scab from wound bed. Pretty adherent scabbing. Mostly left in place to allow to heal beneath. Patient tolerated well.

## 2024-02-09 NOTE — PATIENT INSTRUCTIONS
-Keep your wound dressing clean, dry, and intact.    -Change your dressing if it becomes soiled, soaked, or falls off.    -Should you experience any significant changes in your wound(s), such as infection (redness, swelling, localized heat, increased pain, fever > 101 F, chills) or have any questions regarding your home care instructions, please contact the wound center at [phone]. If after hours, contact your primary care physician or go to the hospital emergency room.

## 2024-02-16 ENCOUNTER — APPOINTMENT (OUTPATIENT)
Dept: WOUND CARE | Facility: MEDICAL CENTER | Age: 79
End: 2024-02-16
Attending: FAMILY MEDICINE
Payer: MEDICARE

## 2024-02-23 ENCOUNTER — NON-PROVIDER VISIT (OUTPATIENT)
Dept: WOUND CARE | Facility: MEDICAL CENTER | Age: 79
End: 2024-02-23
Attending: FAMILY MEDICINE
Payer: MEDICARE

## 2024-02-23 PROCEDURE — 99211 OFF/OP EST MAY X REQ PHY/QHP: CPT

## 2024-02-23 NOTE — PATIENT INSTRUCTIONS
-Keep your wound dressing clean, dry, and intact. Only change dressing if it's over saturated, soiled or falls off.     -Change your dressing every 3 to 4 days and if it becomes soiled, soaked, or falls off.    -Should you experience any significant changes in your wound(s), such as infection (redness, swelling, localized heat, increased pain, fever > 101 F, chills) or have any questions regarding your home care instructions, please contact the wound center at [phone]. If after hours, contact your primary care physician or go to the hospital emergency room.

## 2024-02-26 ENCOUNTER — TELEPHONE (OUTPATIENT)
Dept: WOUND CARE | Facility: MEDICAL CENTER | Age: 79
End: 2024-02-26
Payer: MEDICARE

## 2024-02-27 NOTE — TELEPHONE ENCOUNTER
RN who saw patient at last clinic visit submitted inquiry to [facility] requesting supply order refill for pt. Original order was only for 30 days. Received message from [facility] today requesting new order with progress note be submitted. Pt is to be seen in clinic this Friday 3/1. Clinician seeing patient at next visit should place new supply order if indicated.

## 2024-03-08 ENCOUNTER — NON-PROVIDER VISIT (OUTPATIENT)
Dept: WOUND CARE | Facility: MEDICAL CENTER | Age: 79
End: 2024-03-08
Attending: FAMILY MEDICINE
Payer: MEDICARE

## 2024-03-08 PROCEDURE — 97597 DBRDMT OPN WND 1ST 20 CM/<: CPT

## 2024-03-08 NOTE — PROGRESS NOTES
Advanced [facility]  [facility]   [address]   [address]   [place] [address]   [phone] Fax: [phone]    Duration of Supply Order: 90 Days  Dispense as written.  Supply order to [facility] for silicone adhesive foam only per pt request.      Wound 12/29/23 Ankle;Heel Medial Right (Active)   Wound Image   03/08/24 0930   Site Assessment Red;Yellow 03/08/24 0930   Periwound Assessment Hemosiderin Staining;Edema;Fragile 03/08/24 0930   Margins Attached edges 03/08/24 0930   Closure Secondary intention 03/08/24 0930   Drainage Amount Moderate 03/08/24 0930   Drainage Description Serosanguineous 03/08/24 0930   Treatments Cleansed;Topical Lidocaine;CSWD - Conservative Sharp Wound Debridement;Site care 03/08/24 0930   Wound Cleansing Hypochlorus Acid 03/08/24 0930   Periwound Protectant No-sting Skin Prep 03/08/24 0930   Dressing Changed Changed 03/08/24 0930   Dressing Cleansing/Solutions Not Applicable 03/08/24 0930   Dressing Options Hydrofiber Silver;Silicone Adhesive Foam;Other (Comments) 03/08/24 0930   Dressing Change/Treatment Frequency Twice Weekly 03/08/24 0930   Wound Team Following Bi-Monthly 03/08/24 0930   Wound Length (cm) 1.5 cm 03/08/24 0930   Wound Width (cm) 0.9 cm 03/08/24 0930   Wound Depth (cm) 0.1 cm 03/08/24 0930   Wound Surface Area (cm^2) 1.35 cm^2 03/08/24 0930   Wound Volume (cm^3) 0.135 cm^3 03/08/24 0930   Post-Procedure Length (cm) 1.5 cm 03/08/24 0930   Post-Procedure Width (cm) 0.9 cm 03/08/24 0930   Post-Procedure Depth (cm) 0.1 cm 03/08/24 0930   Post-Procedure Surface Area (cm^2) 1.35 cm^2 03/08/24 0930   Post-Procedure Volume (cm^3) 0.135 cm^3 03/08/24 0930   Wound Healing % 87 03/08/24 0930   Tunneling (cm) 0 cm 03/08/24 0930   Undermining (cm) 0 cm 03/08/24 0930   Wound Odor None 03/08/24 0930   Pulses Right;Left;DP;PT;Doppler;Other (Comments) 12/29/23 1000   Exposed Structures None 03/08/24 0930     PROCEDURE:   2% topical Lidocaine applied prior to  debridement with ~5 minute dwell time. Conservative sharp wound debridement using curette to remove 1.35 cm2 nonviable tissue from wound bed. Patient tolerated well; denied pain.

## 2024-03-08 NOTE — PROGRESS NOTES
CSWD using curette to remove approx. ~1.35 cm2 of nonviable tissue from wound bed.     Pt came in bandaid over her wound. Wound is measuring a little bit larger. Discussed using hydrofiber silver over wound for moisture control.

## 2024-03-12 ENCOUNTER — TELEPHONE (OUTPATIENT)
Dept: WOUND CARE | Facility: MEDICAL CENTER | Age: 79
End: 2024-03-12
Payer: MEDICARE

## 2024-03-12 NOTE — TELEPHONE ENCOUNTER
Most recent progress note from provider visit uploaded to [employer]. Please schedule this patient with a provider as soon as possible in order to obtain supplies for insurance purposes.

## 2024-03-22 ENCOUNTER — NON-PROVIDER VISIT (OUTPATIENT)
Dept: WOUND CARE | Facility: MEDICAL CENTER | Age: 79
End: 2024-03-22
Attending: FAMILY MEDICINE
Payer: MEDICARE

## 2024-03-22 PROCEDURE — 97597 DBRDMT OPN WND 1ST 20 CM/<: CPT

## 2024-03-22 NOTE — PROGRESS NOTES
2% Viscous lidocaine applied to wound and periwound 5 minutes dwell time.   CSWD using curette to remove approx. ~1.04 cm2 of nonviable tissue from wound bed.    Pt states she has received wound care supply from [employer]. However pt may be having reaction from the adhesive part of the Aquacel Foam. Tried nonadhesive foam and hypafix tape. If pt has reaction to hypafix tape pt is to use paper tape that she has at home.

## 2024-03-22 NOTE — PATIENT INSTRUCTIONS
Apply Aquacel Ag to wound bed as directed by provider. Cover wound with nonadhesive foam and change dressing every 3~4 days. Secure dressing with hypafix tape or paper tape.    Avoid prolonged standing or sitting without elevating your legs.  - Knee-high gradient compression to legs    Should you experience any significant changes in your wound(s), such as infection (redness, swelling, localized heat, increased pain, fever > 101 F, chills) or have any questions regarding your home care instructions, please contact the wound center at [phone]. If after hours, contact your primary care physician or go to the hospital emergency room.   Keep dressing clean, dry and covered while bathing. Only change dressing if it becomes over saturated, soiled or falls off.

## 2024-03-29 ENCOUNTER — OFFICE VISIT (OUTPATIENT)
Dept: WOUND CARE | Facility: MEDICAL CENTER | Age: 79
End: 2024-03-29
Attending: FAMILY MEDICINE
Payer: MEDICARE

## 2024-03-29 VITALS
OXYGEN SATURATION: 94 % | SYSTOLIC BLOOD PRESSURE: 128 MMHG | DIASTOLIC BLOOD PRESSURE: 80 MMHG | HEART RATE: 55 BPM | RESPIRATION RATE: 18 BRPM | TEMPERATURE: 97.9 F

## 2024-03-29 DIAGNOSIS — I83.013 VENOUS STASIS ULCER OF RIGHT ANKLE WITH FAT LAYER EXPOSED WITH VARICOSE VEINS (HCC): ICD-10-CM

## 2024-03-29 DIAGNOSIS — M20.42 HAMMERTOES OF BOTH FEET: ICD-10-CM

## 2024-03-29 DIAGNOSIS — R52 PAIN ASSOCIATED WITH WOUND: ICD-10-CM

## 2024-03-29 DIAGNOSIS — T14.8XXA PAIN ASSOCIATED WITH WOUND: ICD-10-CM

## 2024-03-29 DIAGNOSIS — R60.0 EDEMA OF BOTH LOWER LEGS: ICD-10-CM

## 2024-03-29 DIAGNOSIS — M20.41 HAMMERTOES OF BOTH FEET: ICD-10-CM

## 2024-03-29 DIAGNOSIS — B35.1 MYCOTIC TOENAILS: ICD-10-CM

## 2024-03-29 DIAGNOSIS — L97.312 VENOUS STASIS ULCER OF RIGHT ANKLE WITH FAT LAYER EXPOSED WITH VARICOSE VEINS (HCC): ICD-10-CM

## 2024-03-29 PROCEDURE — 99214 OFFICE O/P EST MOD 30 MIN: CPT

## 2024-03-29 NOTE — PROGRESS NOTES
Provider Encounter- Lower Extremity Ulcer      HISTORY OF PRESENT ILLNESS  Wound History:    START OF CARE IN CLINIC: 12/29/2023               REFERRING PROVIDER: Nils Frausto MD                  WOUND ETIOLOGY: venous insufficiency              LOCATION: right medial ankle              HISTORY: Patient with long history of CVI referred to the clinic for treatment of a right medial ankle ulcer.  This wound started in October of this year, patient feels this was caused by rubbing from her sock and shoes.  She has a history of similar ulcers to her left lower extremity.  She has undergone multiple previous venous procedures, including ablations, to both legs in the past, has been seen at [facility].   Her PCP prescribed 2 courses of clindamycin.           Pertinent Medical History: Chronic venous insufficiency, atrial fibrillation      ETIOLOGY HISTORY: Diagnosed unknown. Vascular Surgeon: Dr. Goldstein. Previous vascular procedures ablation procedures. Compression stockings 20 to 40 mmHg. Varicose Veins present        TOBACCO USE: Former smoker, quit in 1972    Patient's problem list, allergies, and current medications reviewed and updated in Epic    Interval History:  1/5/2024 : Initial provider visit with ADRIANA Chi, RYAN, MARIA ISABELN, JUANPABLO.  Patient is here today with her .  She states that overall she is feeling well.  Tolerating compression wrap without any difficulty.  Wound measures smaller today.  She has completed her antibiotics.      1/15/2024: Clinic visit with Claudia WRIGHT, RYAN, WILVER, CFNILAY.  Pt denies fevers, chills, nausea, vomiting.  Has not received Solaris wrap for right leg.  Followed by [facility].  Has had history of ablation to right thigh and knee but never to right ankle.  Ulcer has decreased in size to right medial ankle.  Hammertoes bilaterally with mycotic overgrown toenails with callusing.  Referral to podiatry placed.  Discussed with patient following up  with local orthotist for custom inserts.  Patient does not have diabetes.    1/22/2024:  Clinic visit with RYAN March, WILVER, JUANPABLO.  Pt denies fevers, chills, nausea, vomiting.  Accompanied by .  Ulcer has decreased in size.  Solaris wrap at bedside.  Will transition patient into Solaris compression wrap today.  Toenails have been trimmed in the meantime by .  Reported ladi to right third toe, no ulceration today.  There is thin scab.  Podiatry referral was placed last visit.  Patient has not had a chance to follow-up with orthotic company.   local orthotic clinic information given to patient.  Patient additionally has Achilles tightness.  Recommend PT.  Patient/family would like to discuss this further prior to referral placement.    3/29/24: Clinic visit with RYAN March, JUANPABLO PETTIT.  Pt denies fevers, chills, nausea, vomiting. Only wearing  from solaris to RLE. Wearing 20-30mmhg compression sock to LLE.  Right medial ankle ulcer with dried exudate, crust forming.  Jozef at wound edges.        REVIEW OF SYSTEMS:   Unchanged from previous wound clinic assessment on 1/22/24, except as noted in interval history above        PHYSICAL EXAMINATION:   /80   Pulse (!) 55   Temp 36.6 °C (97.9 °F) (Temporal)   Resp 18   SpO2 94%     Physical Exam  Cardiovascular:      Rate and Rhythm: Bradycardia present.      Comments: Right DP +2, right PT +1 biphasic by Doppler  Pulmonary:      Effort: Pulmonary effort is normal.   Musculoskeletal:      Right lower leg: Edema present.      Left lower leg: Edema present.      Comments: Compression stocking to left lower extremity  Edema to right lower extremity 2-3+    Hammertoes bilaterally  Achilles tightening bilaterally   Skin:     Comments: Full-thickness ulcer to right medial ankle-ulcer has decreased in area since last provider assessment.  No drainage.  Scant serosanguineous drainage with manual  pressure, mild tenderness with light palpation.  Wound edges terra, dried exudate present with crust forming towards medial aspect of wound, no evidence of infection   Neurological:      Mental Status: She is alert and oriented to person, place, and time.   Psychiatric:         Mood and Affect: Mood normal.         WOUND ASSESSMENT  Wound 12/29/23 Ankle;Heel Medial Right (Active)   Wound Image   03/29/24 1000   Site Assessment Scabbed;Dry 03/29/24 1000   Periwound Assessment Hemosiderin Staining;Edema;Fragile 03/29/24 1000   Margins Attached edges 03/29/24 1000   Closure Secondary intention 03/08/24 0930   Drainage Amount None 03/29/24 1000   Drainage Description Serosanguineous 03/22/24 1000   Treatments Cleansed;Topical Lidocaine;Site care 03/29/24 1000   Wound Cleansing Hypochlorus Acid 03/29/24 1000   Periwound Protectant No-sting Skin Prep 03/29/24 1000   Dressing Changed New 03/29/24 1000   Dressing Cleansing/Solutions Not Applicable 03/29/24 1000   Dressing Options Hydrofiber Silver;Nonadhesive Foam;Hypafix Tape;Other (Comments) 03/29/24 1000   Dressing Change/Treatment Frequency Twice Weekly 03/29/24 1000   Wound Team Following Weekly 03/29/24 1000   Wound Length (cm) 1.3 cm 03/22/24 1000   Wound Width (cm) 0.8 cm 03/22/24 1000   Wound Depth (cm) 0.1 cm 03/22/24 1000   Wound Surface Area (cm^2) 1.04 cm^2 03/22/24 1000   Wound Volume (cm^3) 0.104 cm^3 03/22/24 1000   Post-Procedure Length (cm) 1.3 cm 03/22/24 1000   Post-Procedure Width (cm) 0.8 cm 03/22/24 1000   Post-Procedure Depth (cm) 0.1 cm 03/22/24 1000   Post-Procedure Surface Area (cm^2) 1.04 cm^2 03/22/24 1000   Post-Procedure Volume (cm^3) 0.104 cm^3 03/22/24 1000   Wound Healing % 90 03/22/24 1000   Tunneling (cm) 0 cm 03/29/24 1000   Undermining (cm) 0 cm 03/29/24 1000   Wound Odor None 03/29/24 1000   Pulses Right;Left;DP;PT;Doppler;Other (Comments) 12/29/23 1000   Exposed Structures None 03/29/24 1000   Number of days: 91  "          PROCEDURE:   -No excisional debridement performed today  -Wound care completed by wound RN, refer to wound flowsheet   -Patient tolerated the procedure well, without c/o of significant pain or discomfort.       Pertinent Labs and Diagnostics:    Labs:   A1c:   Lab Results   Component Value Date/Time    HBA1C 5.9 (H) 04/26/2023 09:34 AM            IMAGING: None found in epic    VASCULAR STUDIES: None found in epic  No results found.    LAST  WOUND CULTURE:  DATE :  No results found for: \"CULTRSULT\"           ASSESSMENT AND PLAN:     1. Venous stasis ulcer of right ankle with fat layer exposed with varicose veins (HCC)    3/29/2024:   Wound area has decreased since last provider assessment.  Wound edges terra, crust forming.  Scant exudate with manual pressure to the wound.  -No excisional debridement performed today.  - Discussed in detail with patient the importance of wearing therapeutic compression.  She does have Solaris ready wrap kit but is only wearing the liner which is approximately 10 mmHg compression.  Informed patient that is important to wear Velcro component to provide necessary compression to continue to heal the ulcer.  - If ulcer does not improve, patient aware will transition into 2 layer compression wrap.  Verbalized understanding.   -Patient to return to clinic weekly for assessment and debridement    Wound care: Silver Hydrofiber to keep exudate dry and to form stable crust,  Adhesive foam,  Solaris compression garment,     2. Pain associated with wound    3/29/24: Improved  Minimal tenderness with light palpation to wound    3. Edema of both lower legs    3/29/2024:   -Solaris wrap today.  Established with [place] [facility].  Reports that MD plans to perform vein ablations to LLE.  No plans for RLE at this time      4.  Mycotic toenails  Hammertoes of both feet  3/29/2024: Saw Dr. Juarez for foot and nail care.  Podiatrist okay with  to continue to perform nail care.  " Previously given information about orthotics.  Unknown if she has obtained orthotics yet.        PATIENT EDUCATION  - Etiology of  venous stasis ulceration discussed with patient  - Importance of managing edema for healing of ulcer, and for prevention of new ulcer development  -Need for lifelong compression of lower legs   -Elevate legs above the level of the heart periodically throughout the day.  - Importance of adequate nutrition for wound healing  -Advised to go to ER for any increased redness, swelling, drainage or odor, or if patient develops fever, chills, nausea or vomiting.    My total time spent caring for the patient on the day of the encounter was 30 minutes, reviewing history, assessment, counseling and education, and coordination of care.  This does not include time spent on separately billable procedures/tests.        Please note that this note may have been created using voice recognition software. I have worked with technical experts from [employer] to optimize the interface.  I have made every reasonable attempt to correct obvious errors, but there may be errors of grammar and possibly     N

## 2024-03-29 NOTE — PATIENT INSTRUCTIONS
-Keep your wound dressing clean, dry, and intact. Only change dressing if it's over saturated, soiled or falls off.     -Should you experience any significant changes in your wound(s), such as infection (redness, swelling, localized heat, increased pain, fever > 101 F, chills) or have any questions regarding your home care instructions, please contact the wound center at [phone]. If after hours, contact your primary care physician or go to the hospital emergency room.

## 2024-03-29 NOTE — PROGRESS NOTES
Patient presented to clinic today wearing only the fusion liner sock to RLE without the solaris ready wrap. Patient educated that she needs to wear the wrap with the . Patient and  educated that if she is non-compliant with her solaris ready wrap over the week, then we will re-apply the 2 layer compression wrap at next visit. Patient in agreement with plan.     Patients wound was very dry today with no drainage. Aquacel Ag re-applied in clinic today in an effort to keep the wound dry.

## 2024-04-05 ENCOUNTER — NON-PROVIDER VISIT (OUTPATIENT)
Dept: WOUND CARE | Facility: MEDICAL CENTER | Age: 79
End: 2024-04-05
Attending: FAMILY MEDICINE
Payer: MEDICARE

## 2024-04-05 PROCEDURE — 97597 DBRDMT OPN WND 1ST 20 CM/<: CPT

## 2024-04-05 NOTE — PROGRESS NOTES
2% viscous lidocaine applied topically to wound bed for approximately 5 minutes prior to debridement. Conservative sharp wound debridement with curette to debride wound bed and periwound of non-viable tissue. Entire surface of wound, < 20 cm2 debrided. Refer to flow sheet for wound care details. Patient tolerated the procedure well.

## 2024-04-05 NOTE — PATIENT INSTRUCTIONS
-Keep your wound dressing clean, dry, and intact. Only change dressing if it's over saturated, soiled or falls off.     -Change your dressing if it becomes soiled, soaked, or falls off.    -Should you experience any significant changes in your wound(s), such as infection (redness, swelling, localized heat, increased pain, fever > 101 F, chills) or have any questions regarding your home care instructions, please contact the wound center at [phone]. If after hours, contact your primary care physician or go to the hospital emergency room.

## 2024-04-10 ENCOUNTER — APPOINTMENT (OUTPATIENT)
Dept: WOUND CARE | Facility: MEDICAL CENTER | Age: 79
End: 2024-04-10
Attending: FAMILY MEDICINE
Payer: MEDICARE

## 2024-04-17 ENCOUNTER — NON-PROVIDER VISIT (OUTPATIENT)
Dept: WOUND CARE | Facility: MEDICAL CENTER | Age: 79
End: 2024-04-17
Attending: FAMILY MEDICINE
Payer: MEDICARE

## 2024-04-17 PROCEDURE — 97597 DBRDMT OPN WND 1ST 20 CM/<: CPT

## 2024-04-17 NOTE — PROGRESS NOTES
CSWD using curette to remove ~0.5cm2 nonviable tissue from  right medial ankle wound bed. 2% topical Lidocaine applied prior to debridement with ~5 minute dwell time. Patient tolerated well; denied pain.     ** Changed dressing today r/t patient reports she is unable shower with current dressing and this affects quality of life. Silver Hydrofiber sticking to wound bed and needs to be soaked to take off.   Changed to collagen and hydrofera blue ready to encourage tissue growth and maintain moist environment. Covered with transparent film. Provided patient with extra supplies for next dressing change.

## 2024-04-17 NOTE — PATIENT INSTRUCTIONS
-Keep your wound dressing clean, dry, and intact. Only change dressing if it's over saturated, soiled or falls off.     -Change your dressing if it becomes soiled, soaked, or falls off.    -Should you experience any significant changes in your wound(s), such as infection (redness, swelling, localized heat, increased pain, fever > 101 F, chills) or have any questions regarding your home care instructions, please contact the wound center at [phone]. If after hours, contact your primary care physician or go to the hospital emergency room.

## 2024-04-22 ENCOUNTER — OFFICE VISIT (OUTPATIENT)
Dept: WOUND CARE | Facility: MEDICAL CENTER | Age: 79
End: 2024-04-22
Attending: FAMILY MEDICINE
Payer: MEDICARE

## 2024-04-22 VITALS
RESPIRATION RATE: 16 BRPM | OXYGEN SATURATION: 93 % | TEMPERATURE: 98.3 F | HEART RATE: 64 BPM | SYSTOLIC BLOOD PRESSURE: 120 MMHG | DIASTOLIC BLOOD PRESSURE: 60 MMHG

## 2024-04-22 DIAGNOSIS — R52 PAIN ASSOCIATED WITH WOUND: ICD-10-CM

## 2024-04-22 DIAGNOSIS — M20.41 HAMMERTOES OF BOTH FEET: ICD-10-CM

## 2024-04-22 DIAGNOSIS — R60.0 EDEMA OF BOTH LOWER LEGS: ICD-10-CM

## 2024-04-22 DIAGNOSIS — M20.42 HAMMERTOES OF BOTH FEET: ICD-10-CM

## 2024-04-22 DIAGNOSIS — I83.013 VENOUS STASIS ULCER OF RIGHT ANKLE WITH FAT LAYER EXPOSED WITH VARICOSE VEINS (HCC): ICD-10-CM

## 2024-04-22 DIAGNOSIS — T14.8XXA PAIN ASSOCIATED WITH WOUND: ICD-10-CM

## 2024-04-22 DIAGNOSIS — B35.1 MYCOTIC TOENAILS: ICD-10-CM

## 2024-04-22 DIAGNOSIS — L97.312 VENOUS STASIS ULCER OF RIGHT ANKLE WITH FAT LAYER EXPOSED WITH VARICOSE VEINS (HCC): ICD-10-CM

## 2024-04-22 PROCEDURE — 11042 DBRDMT SUBQ TIS 1ST 20SQCM/<: CPT

## 2024-04-22 PROCEDURE — 3074F SYST BP LT 130 MM HG: CPT | Performed by: NURSE PRACTITIONER

## 2024-04-22 PROCEDURE — 99213 OFFICE O/P EST LOW 20 MIN: CPT

## 2024-04-22 PROCEDURE — 99213 OFFICE O/P EST LOW 20 MIN: CPT | Mod: 25 | Performed by: NURSE PRACTITIONER

## 2024-04-22 PROCEDURE — 3078F DIAST BP <80 MM HG: CPT | Performed by: NURSE PRACTITIONER

## 2024-04-22 PROCEDURE — 11042 DBRDMT SUBQ TIS 1ST 20SQCM/<: CPT | Performed by: NURSE PRACTITIONER

## 2024-04-22 NOTE — PATIENT INSTRUCTIONS
Avoid prolonged standing or sitting without elevating your legs.  - Knee-high gradient compression to legs    Should you experience any significant changes in your wound(s), such as infection (redness, swelling, localized heat, increased pain, fever > 101 F, chills) or have any questions regarding your home care instructions, please contact the wound center at [phone]. If after hours, contact your primary care physician or go to the hospital emergency room.   Keep dressing clean, dry and covered while bathing. Only change dressing if it becomes over saturated, soiled or falls off every time after you shower. Wash wound well after showering.

## 2024-04-22 NOTE — PROGRESS NOTES
Provider Encounter- Lower Extremity Ulcer      HISTORY OF PRESENT ILLNESS  Wound History:    START OF CARE IN CLINIC: 12/29/2023               REFERRING PROVIDER: Nils Frausto MD                  WOUND ETIOLOGY: venous insufficiency              LOCATION: right medial ankle     Right distal lower leg satellite ulcer-noted 4/22/2024                 HISTORY: Patient with long history of CVI referred to the clinic for treatment of a right medial ankle ulcer.  This wound started in October of this year, patient feels this was caused by rubbing from her sock and shoes.  She has a history of similar ulcers to her left lower extremity.  She has undergone multiple previous venous procedures, including ablations, to both legs in the past, has been seen at [facility] [place].   Her PCP prescribed 2 courses of clindamycin.           Pertinent Medical History: Chronic venous insufficiency, atrial fibrillation      ETIOLOGY HISTORY: Diagnosed unknown. Vascular Surgeon: Dr. Goldstein. Previous vascular procedures ablation procedures. Compression stockings 20 to 40 mmHg. Varicose Veins present        TOBACCO USE: Former smoker, quit in 1972    Patient's problem list, allergies, and current medications reviewed and updated in Epic    Interval History:  1/5/2024 : Initial provider visit with ADRIANA Chi, RYAN, CWMATEON, CFCN.  Patient is here today with her .  She states that overall she is feeling well.  Tolerating compression wrap without any difficulty.  Wound measures smaller today.  She has completed her antibiotics.      1/15/2024: Clinic visit with Claudia WRIGHT, RYAN, WILVER, CFCN.  Pt denies fevers, chills, nausea, vomiting.  Has not received Solaris wrap for right leg.  Followed by [facility].  Has had history of ablation to right thigh and knee but never to right ankle.  Ulcer has decreased in size to right medial ankle.  Hammertoes bilaterally with mycotic overgrown toenails with callusing.   Referral to podiatry placed.  Discussed with patient following up with local orthotist for custom inserts.  Patient does not have diabetes.    1/22/2024:  Clinic visit with RYAN March CWON, CFCN.  Pt denies fevers, chills, nausea, vomiting.  Accompanied by .  Ulcer has decreased in size.  Solaris wrap at bedside.  Will transition patient into Solaris compression wrap today.  Toenails have been trimmed in the meantime by .  Reported ladi to right third toe, no ulceration today.  There is thin scab.  Podiatry referral was placed last visit.  Patient has not had a chance to follow-up with orthotic company.   local orthotic clinic information given to patient.  Patient additionally has Achilles tightness.  Recommend PT.  Patient/family would like to discuss this further prior to referral placement.    3/29/24: Clinic visit with RYAN March CWON, CFCN.  Pt denies fevers, chills, nausea, vomiting. Only wearing  from solaris to RLE. Wearing 20-30mmhg compression sock to LLE.  Right medial ankle ulcer with dried exudate, crust forming.  Jozef at wound edges.    4/22/24: Clinic visit with RYAN March CWON, CFCN.  Pt denies fevers, chills, nausea, vomiting.  Accompanied by .  Ulcer has decreased in area.  Unfortunately she does present with a new satellite ulcer proximal from Tegaderm that was placed to allow patient to shower comfortably without removing dressing.  She does have increased edema to the periwound and CHOLO skin.  She is wearing Solaris compression garment however area does not cover the ankle.            REVIEW OF SYSTEMS:   Unchanged from previous wound clinic assessment on 3/29//24, except as noted in interval history above        PHYSICAL EXAMINATION:   /60   Pulse 64   Temp 36.8 °C (98.3 °F) (Temporal)   Resp 16   SpO2 93%     Physical Exam  Cardiovascular:      Rate and Rhythm: Bradycardia  present.      Comments: Right DP +2, right PT +1 biphasic by Doppler  Pulmonary:      Effort: Pulmonary effort is normal.   Musculoskeletal:      Right lower leg: Edema present.      Left lower leg: Edema present.      Comments: Compression stocking to left lower extremity  Edema to right lower extremity 3+ pitting-not controlled by Solaris compression garment    Hammertoes bilaterally  Achilles tightening bilaterally   Skin:     Comments: Full-thickness ulcer to right medial ankle-ulcer has decreased in area since last provider assessment.  Scant serous drainage.  No tenderness, scar tissue, no evidence of infection    Satellite ulcer right anterior lower leg: Nonviable epithelium, scant slough, no evidence of infection    Significant hemosiderin staining   Neurological:      Mental Status: She is alert and oriented to person, place, and time.   Psychiatric:         Mood and Affect: Mood normal.         WOUND ASSESSMENT  Wound 12/29/23 Ankle Medial Right (Active)   Wound Image    04/22/24 1100   Site Assessment Yellow;Red 04/22/24 1100   Periwound Assessment Hemosiderin Staining;Scar tissue;Satellite lesions;Edema 04/22/24 1100   Margins Attached edges 04/22/24 1100   Closure Secondary intention 03/08/24 0930   Drainage Amount Small 04/22/24 1100   Drainage Description Serosanguineous 04/22/24 1100   Treatments Cleansed;Topical Lidocaine;Provider debridement 04/22/24 1100   Wound Cleansing Hypochlorus Acid 04/22/24 1100   Periwound Protectant No-sting Skin Prep 04/22/24 1100   Dressing Status Old drainage 04/22/24 1100   Dressing Changed Changed 04/22/24 1100   Dressing Cleansing/Solutions Normal Saline 04/22/24 1100   Dressing Options Collagen Dressing;Soft Conforming Roll 04/22/24 1100   Dressing Change/Treatment Frequency Every 48 hrs, and As Needed 04/22/24 1100   Wound Team Following Weekly 04/22/24 1100   Wound Length (cm) 0.7 cm 04/22/24 1100   Wound Width (cm) 0.7 cm 04/22/24 1100   Wound Depth (cm) 0.1 cm  "04/17/24 1000   Wound Surface Area (cm^2) 0.49 cm^2 04/22/24 1100   Wound Volume (cm^3) 0.06 cm^3 04/17/24 1000   Post-Procedure Length (cm) 0.7 cm 04/22/24 1100   Post-Procedure Width (cm) 0.5 cm 04/22/24 1100   Post-Procedure Depth (cm) 0.1 cm 04/22/24 1100   Post-Procedure Surface Area (cm^2) 0.35 cm^2 04/22/24 1100   Post-Procedure Volume (cm^3) 0.035 cm^3 04/22/24 1100   Wound Healing % 94 04/17/24 1000   Tunneling (cm) 0 cm 04/22/24 1100   Undermining (cm) 0 cm 04/22/24 1100   Wound Odor None 04/22/24 1100   Pulses Right;Left;DP;PT;Doppler;Other (Comments) 12/29/23 1000   Exposed Structures None 04/22/24 1100   Number of days: 115           PROCEDURE: 2% viscous lidocaine applied and allowed to dwell for approximately 10 minutes  - Using curette, scalpel, excised nonviable tissue until healthy bleeding tissue present from ankle wound and satellite ulcer.  Total area debrided from both wounds 0.6cm2 to subcutaneous tissue level.  Bleeding controlled with manual pressure.  -Wound care completed by wound RN, refer to wound flowsheet   -Patient tolerated the procedure well, without c/o of significant pain or discomfort.       Pertinent Labs and Diagnostics:    Labs:   A1c:   Lab Results   Component Value Date/Time    HBA1C 5.9 (H) 04/26/2023 09:34 AM            IMAGING: None found in epic    VASCULAR STUDIES: None found in epic  No results found.    LAST  WOUND CULTURE:  DATE :  No results found for: \"CULTRSULT\"           ASSESSMENT AND PLAN:     1. Venous stasis ulcer of right ankle with fat layer exposed with varicose veins (HCC)    4/22/2024:   Wound area has decreased since last provider assessment.  Wound edges terra, thick scar tissue.  Does have a new satellite ulcer proximal from Tegaderm.  - excisional debridement performed today.  Medically necessary for wound healing  -Patient to wear 20 to 30 mmHg compression socks that she has. Discussed previously with patient the importance of wearing " therapeutic compression.  She does have Solaris ready wrap kit, unfortunately Velcro wrap does not include the ankle  - If ulcer does not improve, patient aware will transition into 2 layer compression wrap.  Verbalized understanding.   -Patient to return to clinic weekly for assessment and debridement  -Patient to change dressing 2-3 times per week to allow her to shower.    Wound care: Collagen, roll gauze, Hypafix tape, personal compression sock     2. Pain associated with wound    4/22/24: Improved  Minimal tenderness with excisional debridement.  Tolerated with 2% viscous lidocaine.    3. Edema of both lower legs    4/22/2024:   -Solaris wrap today.  Patient to switch to personal compression sock 20 to 30 mmHg continuous when at home to maintain compression over foot and ankle   Established with [facility].  Reports that MD plans to perform vein ablations to LLE.  No plans for RLE at this time      4.  Mycotic toenails  Hammertoes of both feet  4/22/2024: Saw Dr. Juarez for foot and nail care previously.  Podiatrist okay with  to continue to perform nail care.  Previously given information about orthotics.  Unknown if she has obtained orthotics yet.        PATIENT EDUCATION  - Etiology of  venous stasis ulceration discussed with patient  - Importance of managing edema for healing of ulcer, and for prevention of new ulcer development  -Need for lifelong compression of lower legs   -Elevate legs above the level of the heart periodically throughout the day.  - Importance of adequate nutrition for wound healing  -Advised to go to ER for any increased redness, swelling, drainage or odor, or if patient develops fever, chills, nausea or vomiting.    My total time spent caring for the patient on the day of the encounter was 20 minutes, reviewing history, assessment, counseling and education, and coordination of care.  This does not include time spent on separately billable procedures/tests.        Please  note that this note may have been created using voice recognition software. I have worked with technical experts from [facility] to optimize the interface.  I have made every reasonable attempt to correct obvious errors, but there may be errors of grammar and possibly     N

## 2024-04-24 ENCOUNTER — APPOINTMENT (OUTPATIENT)
Dept: WOUND CARE | Facility: MEDICAL CENTER | Age: 79
End: 2024-04-24
Attending: FAMILY MEDICINE
Payer: MEDICARE

## 2024-04-29 ENCOUNTER — OFFICE VISIT (OUTPATIENT)
Dept: WOUND CARE | Facility: MEDICAL CENTER | Age: 79
End: 2024-04-29
Attending: FAMILY MEDICINE
Payer: MEDICARE

## 2024-04-29 VITALS
RESPIRATION RATE: 16 BRPM | SYSTOLIC BLOOD PRESSURE: 118 MMHG | TEMPERATURE: 98.5 F | HEART RATE: 65 BPM | DIASTOLIC BLOOD PRESSURE: 65 MMHG | OXYGEN SATURATION: 92 %

## 2024-04-29 DIAGNOSIS — R52 PAIN ASSOCIATED WITH WOUND: ICD-10-CM

## 2024-04-29 DIAGNOSIS — R60.0 EDEMA OF BOTH LOWER LEGS: ICD-10-CM

## 2024-04-29 DIAGNOSIS — T14.8XXA PAIN ASSOCIATED WITH WOUND: ICD-10-CM

## 2024-04-29 DIAGNOSIS — B35.1 MYCOTIC TOENAILS: ICD-10-CM

## 2024-04-29 DIAGNOSIS — M20.41 HAMMERTOES OF BOTH FEET: ICD-10-CM

## 2024-04-29 DIAGNOSIS — M20.42 HAMMERTOES OF BOTH FEET: ICD-10-CM

## 2024-04-29 DIAGNOSIS — L97.312 VENOUS STASIS ULCER OF RIGHT ANKLE WITH FAT LAYER EXPOSED WITH VARICOSE VEINS (HCC): ICD-10-CM

## 2024-04-29 DIAGNOSIS — I83.013 VENOUS STASIS ULCER OF RIGHT ANKLE WITH FAT LAYER EXPOSED WITH VARICOSE VEINS (HCC): ICD-10-CM

## 2024-04-29 PROCEDURE — 11042 DBRDMT SUBQ TIS 1ST 20SQCM/<: CPT

## 2024-04-29 NOTE — PROGRESS NOTES
Provider Encounter- Lower Extremity Ulcer      HISTORY OF PRESENT ILLNESS  Wound History:    START OF CARE IN CLINIC: 12/29/2023               REFERRING PROVIDER: Nils Frausto MD                  WOUND ETIOLOGY: venous insufficiency              LOCATION: right medial ankle     Right distal lower leg satellite ulcer-noted 4/22/2024                 HISTORY: Patient with long history of CVI referred to the clinic for treatment of a right medial ankle ulcer.  This wound started in October of this year, patient feels this was caused by rubbing from her sock and shoes.  She has a history of similar ulcers to her left lower extremity.  She has undergone multiple previous venous procedures, including ablations, to both legs in the past, has been seen at [facility] [place].   Her PCP prescribed 2 courses of clindamycin.           Pertinent Medical History: Chronic venous insufficiency, atrial fibrillation      ETIOLOGY HISTORY: Diagnosed unknown. Vascular Surgeon: Dr. Goldstein. Previous vascular procedures ablation procedures. Compression stockings 20 to 40 mmHg. Varicose Veins present        TOBACCO USE: Former smoker, quit in 1972    Patient's problem list, allergies, and current medications reviewed and updated in Epic    Interval History:  1/5/2024 : Initial provider visit with ADRIANA Chi, RYAN, CWMATEON, CFCN.  Patient is here today with her .  She states that overall she is feeling well.  Tolerating compression wrap without any difficulty.  Wound measures smaller today.  She has completed her antibiotics.      1/15/2024: Clinic visit with Claudia WRIGHT, RYAN, WILVER, CFCN.  Pt denies fevers, chills, nausea, vomiting.  Has not received Solaris wrap for right leg.  Followed by [facility].  Has had history of ablation to right thigh and knee but never to right ankle.  Ulcer has decreased in size to right medial ankle.  Hammertoes bilaterally with mycotic overgrown toenails with callusing.   Referral to podiatry placed.  Discussed with patient following up with local orthotist for custom inserts.  Patient does not have diabetes.    1/22/2024:  Clinic visit with RYAN March CWON, CFCN.  Pt denies fevers, chills, nausea, vomiting.  Accompanied by .  Ulcer has decreased in size.  Solaris wrap at bedside.  Will transition patient into Solaris compression wrap today.  Toenails have been trimmed in the meantime by .  Reported ladi to right third toe, no ulceration today.  There is thin scab.  Podiatry referral was placed last visit.  Patient has not had a chance to follow-up with orthotic company.   local orthotic clinic information given to patient.  Patient additionally has Achilles tightness.  Recommend PT.  Patient/family would like to discuss this further prior to referral placement.    3/29/24: Clinic visit with RYAN March CWON, CFCN.  Pt denies fevers, chills, nausea, vomiting. Only wearing  from solaris to RLE. Wearing 20-30mmhg compression sock to LLE.  Right medial ankle ulcer with dried exudate, crust forming.  Jozef at wound edges.    4/22/24: Clinic visit with RYAN March CWON, CFCN.  Pt denies fevers, chills, nausea, vomiting.  Accompanied by .  Ulcer has decreased in area.  Unfortunately she does present with a new satellite ulcer proximal from Tegaderm that was placed to allow patient to shower comfortably without removing dressing.  She does have increased edema to the periwound and CHOLO skin.  She is wearing Solaris compression garment however area does not cover the ankle.      4/29/2024: Clinic visit with Randy Spence MD. Patient reports doing ok. Now with two ulcers, one previously secondary to tegaderm. They report minimal drainage this week, have been using collagen which appears to have dried on wound bed and trapped fluid under desiccated collagen. She did not tolerate hydrofera  blue ready she reports caused pressure on wounds under compression. Will trial contact layer and gauze to avoid adhesives. She continues to wear own compression socks 20-30 or Solaris compression garments.    REVIEW OF SYSTEMS:   Unchanged from previous wound clinic assessment on 4/22/24, except as noted in interval history above    PHYSICAL EXAMINATION:   /65   Pulse 65   Temp 36.9 °C (98.5 °F) (Temporal)   Resp 16   SpO2 92%     Physical Exam  Cardiovascular:      Rate and Rhythm: Bradycardia present.      Comments: Right DP +2, right PT +1 biphasic by Doppler  Pulmonary:      Effort: Pulmonary effort is normal.   Musculoskeletal:      Right lower leg: Edema present.      Left lower leg: Edema present.      Comments: Compression stocking to left lower extremity  Edema to right lower extremity - improved    Hammertoes bilaterally  Achilles tightening bilaterally   Skin:     Comments: Full-thickness ulcer to right medial ankle- Wound covered with desiccated collagen with some trapped fluid on wound bed. No evidence of infection.    Satellite ulcer right anterior lower leg: Wound superficial with slough, no evidence of infection.    Significant hemosiderin staining   Neurological:      Mental Status: She is alert and oriented to person, place, and time.   Psychiatric:         Mood and Affect: Mood normal.         WOUND ASSESSMENT  Wound 12/29/23 Ankle Medial Right (Active)   Wound Image    04/29/24 1410   Site Assessment Scabbed 04/29/24 1410   Periwound Assessment Hemosiderin Staining;Scar tissue;Edema 04/29/24 1410   Margins Attached edges 04/29/24 1410   Closure Secondary intention 03/08/24 0930   Drainage Amount Small 04/29/24 1410   Drainage Description Serosanguineous 04/29/24 1410   Treatments Cleansed;Topical Lidocaine;Provider debridement;Site care 04/29/24 1410   Wound Cleansing Hypochlorus Acid 04/29/24 1410   Periwound Protectant No-sting Skin Prep 04/29/24 1410   Dressing Status Old drainage  04/22/24 1100   Dressing Changed New 04/29/24 1410   Dressing Cleansing/Solutions Not Applicable 04/29/24 1410   Dressing Options Telfa;Hypafix Tape 04/29/24 1410   Dressing Change/Treatment Frequency Every 72 hrs, and As Needed 04/29/24 1410   Wound Team Following Weekly 04/29/24 1410   Wound Length (cm) 0.7 cm 04/22/24 1100   Wound Width (cm) 0.7 cm 04/22/24 1100   Wound Depth (cm) 0.1 cm 04/17/24 1000   Wound Surface Area (cm^2) 0.49 cm^2 04/22/24 1100   Wound Volume (cm^3) 0.06 cm^3 04/17/24 1000   Post-Procedure Length (cm) 0.5 cm 04/29/24 1410   Post-Procedure Width (cm) 0.5 cm 04/29/24 1410   Post-Procedure Depth (cm) 0.2 cm 04/29/24 1410   Post-Procedure Surface Area (cm^2) 0.25 cm^2 04/29/24 1410   Post-Procedure Volume (cm^3) 0.05 cm^3 04/29/24 1410   Wound Healing % 94 04/17/24 1000   Tunneling (cm) 0 cm 04/29/24 1410   Undermining (cm) 0 cm 04/29/24 1410   Wound Odor None 04/22/24 1100   Pulses Right;Left;DP;PT;Doppler;Other (Comments) 12/29/23 1000   Exposed Structures None 04/29/24 1410   Number of days: 122       Wound 04/29/24 Leg Distal;Lower;Medial Right (Active)   Wound Image    04/29/24 1410   Site Assessment [place] 04/29/24 1410   Periwound Assessment Hemosiderin Staining;Edema 04/29/24 1410   Margins Attached edges 04/29/24 1410   Drainage Amount Small 04/29/24 1410   Drainage Description Serosanguineous 04/29/24 1410   Treatments Cleansed;Topical Lidocaine;Provider debridement;Site care 04/29/24 1410   Wound Cleansing Hypochlorus Acid 04/29/24 1410   Periwound Protectant No-sting Skin Prep 04/29/24 1410   Dressing Changed New 04/29/24 1410   Dressing Cleansing/Solutions Not Applicable 04/29/24 1410   Dressing Options Telfa;Hypafix Tape 04/29/24 1410   Dressing Change/Treatment Frequency Every 72 hrs, and As Needed 04/29/24 1410   Wound Team Following Weekly 04/29/24 1410   Non-staged Wound Description Full thickness 04/29/24 1410   Wound Length (cm) 0.2 cm 04/29/24 1410   Wound Width (cm) 0.2 cm  "04/29/24 1410   Wound Depth (cm) 0.1 cm 04/29/24 1410   Wound Surface Area (cm^2) 0.04 cm^2 04/29/24 1410   Wound Volume (cm^3) 0.004 cm^3 04/29/24 1410   Post-Procedure Length (cm) 0.7 cm 04/29/24 1410   Post-Procedure Width (cm) 0.5 cm 04/29/24 1410   Post-Procedure Depth (cm) 0.1 cm 04/29/24 1410   Post-Procedure Surface Area (cm^2) 0.35 cm^2 04/29/24 1410   Post-Procedure Volume (cm^3) 0.035 cm^3 04/29/24 1410   Tunneling (cm) 0 cm 04/29/24 1410   Undermining (cm) 0 cm 04/29/24 1410   Wound Odor None 04/29/24 1410   Exposed Structures None 04/29/24 1410   Number of days: 0       PROCEDURE: Excisional debridement of right proximal and distal medial ankle wounds  -2% viscous lidocaine applied topically to wound bed for approximately 5 minutes prior to debridement  -Curette used to debride wound bed.  Excisional debridement was performed to remove devitalized tissue until healthy, bleeding tissue was visualized.   Entire surface of wound, 0.6 cm2 debrided.  Tissue debrided into the subcutaneous layer.    -Bleeding controlled with manual pressure.    -Wound care completed by wound RN, refer to flowsheet  -Patient tolerated the procedure well, without c/o pain or discomfort.        Pertinent Labs and Diagnostics:    Labs:   A1c:   Lab Results   Component Value Date/Time    HBA1C 5.9 (H) 04/26/2023 09:34 AM            IMAGING: None found in epic    VASCULAR STUDIES: None found in epic  No results found.    LAST  WOUND CULTURE:  DATE :  No results found for: \"CULTRSULT\"           ASSESSMENT AND PLAN:     1. Venous stasis ulcer of right ankle with fat layer exposed with varicose veins (HCC)    4/29/2024: Continues to have two open wounds. Desiccated collagen on wound bed with some trapped underlying fluid.  - Excisional debridement performed today.  Medically necessary for wound healing  - Patient to wear 20 to 30 mmHg compression socks that she has. Discussed previously with patient the importance of wearing " therapeutic compression.  She does have Solaris ready wrap kit with underlying stocking.  - If ulcer does not improve, patient aware will transition into 2 layer compression wrap in future. Deferred today as edema is well controlled with current compression.  - Collagen desiccated on wound bed and trapping fluid. Will discontinue. She did not tolerate hydrofera blue ready.  - As wound is dry and without drainage, telfa only. If increased drainage they were instructed to return to using hydrofiber silver  -Patient to return to clinic weekly for assessment and debridement  -Patient to change dressing 2-3 times per week to allow her to shower.    Wound care: Telfa, tape, personal compression sock     2. Pain associated with wound    4/29/24: Improved  Minimal tenderness with excisional debridement.  Tolerated with 2% viscous lidocaine.    3. Edema of both lower legs    4/29/2024:   - Continue own compression socks and solaris ready wrap.   - Established with [facility].  Reports that MD plans to perform vein ablations to LLE.  No plans for RLE at this time    4. Mycotic toenails  5. Hammertoes of both feet  4/29/2024: Saw Dr. Juarez for foot and nail care previously.  Podiatrist okay with  to continue to perform nail care.  Previously given information about orthotics.  Unknown if she has obtained orthotics yet.    PATIENT EDUCATION  - Etiology of  venous stasis ulceration discussed with patient  - Importance of managing edema for healing of ulcer, and for prevention of new ulcer development  -Need for lifelong compression of lower legs   -Elevate legs above the level of the heart periodically throughout the day.  - Importance of adequate nutrition for wound healing  -Advised to go to ER for any increased redness, swelling, drainage or odor, or if patient develops fever, chills, nausea or vomiting.    Please note that this note may have been created using voice recognition software. I have worked with  [facility] to optimize the interface.  I have made every reasonable attempt to correct obvious errors, but there may be errors of grammar and possibly     N

## 2024-04-29 NOTE — PATIENT INSTRUCTIONS
Cover wound with telfa and change dressing every 2~3 days. Secure dressing with hypafix.    Should you experience any significant changes in your wound(s), such as infection (redness, swelling, localized heat, increased pain, fever > 101 F, chills) or have any questions regarding your home care instructions, please contact the wound center at [phone]. If after hours, contact your primary care physician or go to the hospital emergency room.   Keep dressing clean, dry and covered while bathing. Only change dressing if it becomes over saturated, soiled or falls off.

## 2024-05-06 ENCOUNTER — NON-PROVIDER VISIT (OUTPATIENT)
Dept: WOUND CARE | Facility: MEDICAL CENTER | Age: 79
End: 2024-05-06
Attending: FAMILY MEDICINE
Payer: MEDICARE

## 2024-05-07 NOTE — PROGRESS NOTES
2% viscous lidocaine applied topically to right medial mely and right medial LE wound beds for approximately 5 minutes prior to debridement.   Conservative sharp wound debridement performed with curette to debride wound beds of non-viable tissue, < 20 cm2 debrided. Patient tolerated the procedure well.    Pt's wounds contain fibrous adherent slough even after debridement, some granulation buds visible within. Initiated honey gel dressing today.

## 2024-05-07 NOTE — PATIENT INSTRUCTIONS
-Keep your wound dressing clean, dry, and intact.  Change your dressing every 3 days or if it becomes soiled/dislodged.     -Should you experience any significant changes in your wound(s), such as infection (redness, swelling, localized heat, increased pain, fever > 101 F, chills) or have any questions regarding your home care instructions, please contact the wound center at [phone]. If after hours, contact your primary care physician or go to the hospital emergency room.

## 2024-05-13 ENCOUNTER — OFFICE VISIT (OUTPATIENT)
Dept: WOUND CARE | Facility: MEDICAL CENTER | Age: 79
End: 2024-05-13
Attending: FAMILY MEDICINE
Payer: MEDICARE

## 2024-05-13 VITALS
RESPIRATION RATE: 18 BRPM | DIASTOLIC BLOOD PRESSURE: 66 MMHG | TEMPERATURE: 97.6 F | SYSTOLIC BLOOD PRESSURE: 113 MMHG | OXYGEN SATURATION: 92 % | HEART RATE: 56 BPM

## 2024-05-13 DIAGNOSIS — B35.1 MYCOTIC TOENAILS: ICD-10-CM

## 2024-05-13 DIAGNOSIS — R60.0 EDEMA OF BOTH LOWER LEGS: ICD-10-CM

## 2024-05-13 DIAGNOSIS — I83.013 VENOUS STASIS ULCER OF RIGHT ANKLE WITH FAT LAYER EXPOSED WITH VARICOSE VEINS (HCC): ICD-10-CM

## 2024-05-13 DIAGNOSIS — T14.8XXA PAIN ASSOCIATED WITH WOUND: ICD-10-CM

## 2024-05-13 DIAGNOSIS — M20.41 HAMMERTOES OF BOTH FEET: ICD-10-CM

## 2024-05-13 DIAGNOSIS — M20.42 HAMMERTOES OF BOTH FEET: ICD-10-CM

## 2024-05-13 DIAGNOSIS — R52 PAIN ASSOCIATED WITH WOUND: ICD-10-CM

## 2024-05-13 DIAGNOSIS — L97.312 VENOUS STASIS ULCER OF RIGHT ANKLE WITH FAT LAYER EXPOSED WITH VARICOSE VEINS (HCC): ICD-10-CM

## 2024-05-13 PROCEDURE — 11042 DBRDMT SUBQ TIS 1ST 20SQCM/<: CPT | Performed by: NURSE PRACTITIONER

## 2024-05-13 PROCEDURE — 3074F SYST BP LT 130 MM HG: CPT | Performed by: NURSE PRACTITIONER

## 2024-05-13 PROCEDURE — 3078F DIAST BP <80 MM HG: CPT | Performed by: NURSE PRACTITIONER

## 2024-05-13 NOTE — PROGRESS NOTES
Provider Encounter- Lower Extremity Ulcer      HISTORY OF PRESENT ILLNESS  Wound History:    START OF CARE IN CLINIC: 12/29/2023               REFERRING PROVIDER: Nils Frausto MD                  WOUND ETIOLOGY: venous insufficiency              LOCATION: right medial ankle     Right distal lower leg satellite ulcer-noted 4/22/2024                 HISTORY: Patient with long history of CVI referred to the clinic for treatment of a right medial ankle ulcer.  This wound started in October of this year, patient feels this was caused by rubbing from her sock and shoes.  She has a history of similar ulcers to her left lower extremity.  She has undergone multiple previous venous procedures, including ablations, to both legs in the past, has been seen at [facility].   Her PCP prescribed 2 courses of clindamycin.           Pertinent Medical History: Chronic venous insufficiency, atrial fibrillation      ETIOLOGY HISTORY: Diagnosed unknown. Vascular Surgeon: Dr. Goldstein. Previous vascular procedures ablation procedures. Compression stockings 20 to 40 mmHg. Varicose Veins present        TOBACCO USE: Former smoker, quit in 1972    Patient's problem list, allergies, and current medications reviewed and updated in Epic    Interval History:  1/5/2024 : Initial provider visit with ADRIANA Chi, RYAN, CWMATEON, CFCN.  Patient is here today with her .  She states that overall she is feeling well.  Tolerating compression wrap without any difficulty.  Wound measures smaller today.  She has completed her antibiotics.      1/15/2024: Clinic visit with Claudia WRIGHT, RYAN, WILVER, CFCN.  Pt denies fevers, chills, nausea, vomiting.  Has not received Solaris wrap for right leg.  Followed by [facility].  Has had history of ablation to right thigh and knee but never to right ankle.  Ulcer has decreased in size to right medial ankle.  Hammertoes bilaterally with mycotic overgrown toenails with callusing.   Referral to podiatry placed.  Discussed with patient following up with local orthotist for custom inserts.  Patient does not have diabetes.    1/22/2024:  Clinic visit with RYAN March CWON, CFCN.  Pt denies fevers, chills, nausea, vomiting.  Accompanied by .  Ulcer has decreased in size.  Solaris wrap at bedside.  Will transition patient into Solaris compression wrap today.  Toenails have been trimmed in the meantime by .  Reported ladi to right third toe, no ulceration today.  There is thin scab.  Podiatry referral was placed last visit.  Patient has not had a chance to follow-up with orthotic company.   local orthotic clinic information given to patient.  Patient additionally has Achilles tightness.  Recommend PT.  Patient/family would like to discuss this further prior to referral placement.    3/29/24: Clinic visit with RYAN March CWON, CFCN.  Pt denies fevers, chills, nausea, vomiting. Only wearing  from solaris to RLE. Wearing 20-30mmhg compression sock to LLE.  Right medial ankle ulcer with dried exudate, crust forming.  Jozef at wound edges.    4/22/24: Clinic visit with RYAN March CWON, CFCN.  Pt denies fevers, chills, nausea, vomiting.  Accompanied by .  Ulcer has decreased in area.  Unfortunately she does present with a new satellite ulcer proximal from Tegaderm that was placed to allow patient to shower comfortably without removing dressing.  She does have increased edema to the periwound and CHOLO skin.  She is wearing Solaris compression garment however area does not cover the ankle.      4/29/2024: Clinic visit with Randy Spence MD. Patient reports doing ok. Now with two ulcers, one previously secondary to tegaderm. They report minimal drainage this week, have been using collagen which appears to have dried on wound bed and trapped fluid under desiccated collagen. She did not tolerate hydrofera  blue ready she reports caused pressure on wounds under compression. Will trial contact layer and gauze to avoid adhesives. She continues to wear own compression socks 20-30 or Solaris compression garments.    5/13/24: Clinic visit with Claudia WRIGHT, KIMBER-BC, CWON, CFCN.  Pt denies fevers, chills, nausea, vomiting.  Both ulcers have decreased slightly.  Slough less adherent with use of honey.  Wearing Solaris but reports feel loose.  Not able to wear Solaris wrap over ankle wounds as the edge causes pressure to the wound.  Add Tubigrip with compression liner.    REVIEW OF SYSTEMS:   Unchanged from previous wound clinic assessment on 4/29/24, except as noted in interval history above    PHYSICAL EXAMINATION:   /66   Pulse (!) 56 Comment: RN notified  Temp 36.4 °C (97.6 °F) (Temporal)   Resp 18   SpO2 92%     Physical Exam  Cardiovascular:      Rate and Rhythm: Bradycardia present.      Comments: Right DP +2, right PT +1 biphasic by Doppler  Pulmonary:      Effort: Pulmonary effort is normal.   Musculoskeletal:      Right lower leg: Edema present.      Left lower leg: Edema present.      Comments: Compression stocking to left lower extremity  Edema to right lower extremity -controlled above and below wound, +2 edema nonpitting around wound  Hammertoes bilaterally  Achilles tightening bilaterally   Skin:     Comments: Full-thickness ulcer to right medial ankle-loose slough, area decreased, no evidence of infection.    Satellite ulcer right anterior lower leg: Adherent slough, granular buds, area decreased, no evidence of infection.    Significant hemosiderin staining, atrophie ibis   Neurological:      Mental Status: She is alert and oriented to person, place, and time.   Psychiatric:         Mood and Affect: Mood normal.         WOUND ASSESSMENT  Wound 12/29/23 Ankle Medial Right (Active)   Wound Image    05/13/24 1100   Site Assessment Crusted 05/13/24 1100   Periwound Assessment Hemosiderin  Staining;Edema 05/13/24 1100   Margins Attached edges 05/13/24 1100   Closure Secondary intention 03/08/24 0930   Drainage Amount Small 05/13/24 1100   Drainage Description Serosanguineous 05/13/24 1100   Treatments Cleansed;Topical Lidocaine;Provider debridement;Site care 05/13/24 1100   Wound Cleansing Hypochlorus Acid 05/13/24 1100   Periwound Protectant No-sting Skin Prep 05/13/24 1100   Dressing Status Old drainage 04/22/24 1100   Dressing Changed Changed 05/13/24 1100   Dressing Cleansing/Solutions Not Applicable 05/13/24 1100   Dressing Options Honey Gel;Nonadhesive Foam;Hypafix Tape;Other (Comments) 05/13/24 1100   Dressing Change/Treatment Frequency Every 72 hrs, and As Needed 05/13/24 1100   Wound Team Following Weekly 05/13/24 1100   Wound Length (cm) 0.5 cm 05/06/24 1530   Wound Width (cm) 0.5 cm 05/06/24 1530   Wound Depth (cm) 0.2 cm 05/06/24 1530   Wound Surface Area (cm^2) 0.25 cm^2 05/06/24 1530   Wound Volume (cm^3) 0.05 cm^3 05/06/24 1530   Post-Procedure Length (cm) 0.5 cm 05/13/24 1100   Post-Procedure Width (cm) 0.5 cm 05/13/24 1100   Post-Procedure Depth (cm) 0.3 cm 05/13/24 1100   Post-Procedure Surface Area (cm^2) 0.25 cm^2 05/13/24 1100   Post-Procedure Volume (cm^3) 0.075 cm^3 05/13/24 1100   Wound Healing % 95 05/06/24 1530   Tunneling (cm) 0 cm 05/13/24 1100   Undermining (cm) 0 cm 05/13/24 1100   Wound Odor None 05/13/24 1100   Pulses Right;Left;DP;PT;Doppler;Other (Comments) 12/29/23 1000   Exposed Structures None 05/13/24 1100   Number of days: 136       Wound 04/29/24 Leg Distal;Lower;Medial Right (Active)   Wound Image    05/13/24 1100   Site Assessment Red;Yellow 05/13/24 1100   Periwound Assessment Hemosiderin Staining;Edema 05/13/24 1100   Margins Attached edges 05/13/24 1100   Drainage Amount Small 05/13/24 1100   Drainage Description Serosanguineous 05/13/24 1100   Treatments Cleansed;Topical Lidocaine;Provider debridement;Site care 05/13/24 1100   Wound Cleansing Hypochlorus  Acid 05/13/24 1100   Periwound Protectant No-sting Skin Prep 05/13/24 1100   Dressing Changed Changed 05/13/24 1100   Dressing Cleansing/Solutions Not Applicable 05/13/24 1100   Dressing Options Honey Gel;Nonadhesive Foam;Hypafix Tape;Other (Comments) 05/13/24 1100   Dressing Change/Treatment Frequency Every 72 hrs, and As Needed 05/13/24 1100   Wound Team Following Weekly 05/13/24 1100   Non-staged Wound Description Full thickness 05/13/24 1100   Wound Length (cm) 0.6 cm 05/13/24 1100   Wound Width (cm) 1 cm 05/13/24 1100   Wound Depth (cm) 0.1 cm 05/13/24 1100   Wound Surface Area (cm^2) 0.6 cm^2 05/13/24 1100   Wound Volume (cm^3) 0.06 cm^3 05/13/24 1100   Post-Procedure Length (cm) 0.6 cm 05/13/24 1100   Post-Procedure Width (cm) 1 cm 05/13/24 1100   Post-Procedure Depth (cm) 0.2 cm 05/13/24 1100   Post-Procedure Surface Area (cm^2) 0.6 cm^2 05/13/24 1100   Post-Procedure Volume (cm^3) 0.12 cm^3 05/13/24 1100   Wound Healing % -1400 05/13/24 1100   Tunneling (cm) 0 cm 05/13/24 1100   Undermining (cm) 0 cm 05/13/24 1100   Wound Odor None 05/13/24 1100   Exposed Structures None 05/13/24 1100   Number of days: 14       PROCEDURE: Excisional debridement of right proximal and distal medial ankle wounds  -2% viscous lidocaine applied topically to wound bed for approximately 5 minutes prior to debridement  -Curette used to debride wound bed.  Excisional debridement was performed to remove devitalized tissue until healthy, bleeding tissue was visualized.   Entire surface of wound, 0.85 cm2 debrided.  Tissue debrided into the subcutaneous layer.    -Bleeding controlled with manual pressure.    -Wound care completed by wound RN, refer to flowsheet  -Patient tolerated the procedure well, without c/o pain or discomfort.        Pertinent Labs and Diagnostics:    Labs:   A1c:   Lab Results   Component Value Date/Time    HBA1C 5.9 (H) 04/26/2023 09:34 AM            IMAGING: None found in epic    VASCULAR STUDIES: None found in  "epic  No results found.    LAST  WOUND CULTURE:  DATE :  No results found for: \"CULTRSULT\"           ASSESSMENT AND PLAN:     1. Venous stasis ulcer of right ankle with fat layer exposed with varicose veins (HCC)    5/13/2024: Continues to have two open wounds.  Slough less adherent with honey.  Ulcers have decreased in area slightly.  - Excisional debridement performed today.  Medically necessary for wound healing  -Reports Solaris compression garment is loose.  Unable to get new compression garment until approximately July 2024 as last garment was ordered in January 2024  Add Tubigrip over compression liner to increase compression to gaiter region.  Experiencing discomfort with the edge of the Solaris wrap over the wound.   - Patient to wear 20 to 30 mmHg compression socks that she has. Discussed previously with patient the importance of wearing therapeutic compression.    - If ulcer does not improve, patient aware will transition into 2 layer compression wrap in future.   - Collagen desiccated on wound bed and trapping fluid.  Discontinued.  She did not tolerate hydrofera blue ready.  -Patient to return to clinic weekly for assessment and debridement  -Patient to change dressing 2-3 times per week to allow her to shower.    Wound care: Honey gel, nonadhesive foam, Hypafix tape, knee compression liner, Tubigrip, Solaris wrap      2. Pain associated with wound    5/13/24: Improved  Minimal tenderness with excisional debridement.  Tolerated with 2% viscous lidocaine.    3. Edema of both lower legs    5/13/2024: See above regarding addition of Tubigrip with compression liner and Solaris compression garment  - Not able to receive new Solaris compression wrap until July 2024.  Recommend ordering foot piece as well.  - Continue own compression socks and solaris ready wrap.   - Established with [facility].  Reports that MD plans to perform vein ablations to LLE.  No plans for RLE at this time    4. Mycotic " toenails  5. Hammertoes of both feet    5/13/2024: Saw Dr. Juarez for foot and nail care previously.  Podiatrist okay with  to continue to perform nail care.  Previously given information about orthotics.  Unknown if she has obtained orthotics yet.    PATIENT EDUCATION  - Etiology of  venous stasis ulceration discussed with patient  - Importance of managing edema for healing of ulcer, and for prevention of new ulcer development  -Need for lifelong compression of lower legs   -Elevate legs above the level of the heart periodically throughout the day.  - Importance of adequate nutrition for wound healing  -Advised to go to ER for any increased redness, swelling, drainage or odor, or if patient develops fever, chills, nausea or vomiting.    Please note that this note may have been created using voice recognition software. I have worked with technical experts from [employer] to optimize the interface.  I have made every reasonable attempt to correct obvious errors, but there may be errors of grammar and possibly     N

## 2024-05-13 NOTE — PATIENT INSTRUCTIONS
Apply honey gel to wound bed as directed by provider. Cover wound with foam and change dressing every 2~3 days. Secure dressing with tape    Avoid prolonged standing or sitting without elevating your legs.  - Knee-high gradient compression to legs then apply tubigrip to your legs ending 2 fingers below back of knee without wrinkles.   -Apply Solaris wrap    Should you experience any significant changes in your wound(s), such as infection (redness, swelling, localized heat, increased pain, fever > 101 F, chills) or have any questions regarding your home care instructions, please contact the wound center at [phone]. If after hours, contact your primary care physician or go to the hospital emergency room.   Keep dressing clean, dry and covered while bathing. Only change dressing if it becomes over saturated, soiled or falls off.

## 2024-05-20 ENCOUNTER — NON-PROVIDER VISIT (OUTPATIENT)
Dept: WOUND CARE | Facility: MEDICAL CENTER | Age: 79
End: 2024-05-20
Attending: FAMILY MEDICINE
Payer: MEDICARE

## 2024-05-20 DIAGNOSIS — L97.312 VENOUS STASIS ULCER OF RIGHT ANKLE WITH FAT LAYER EXPOSED WITH VARICOSE VEINS (HCC): ICD-10-CM

## 2024-05-20 DIAGNOSIS — I83.013 VENOUS STASIS ULCER OF RIGHT ANKLE WITH FAT LAYER EXPOSED WITH VARICOSE VEINS (HCC): ICD-10-CM

## 2024-05-20 NOTE — PATIENT INSTRUCTIONS
-Keep your wound dressing clean, dry, and intact.     -Change your dressing every 48 hours and if it becomes soiled, soaked, or falls off.    -Should you experience any significant changes in your wound(s), such as infection (redness, swelling, localized heat, increased pain, fever > 101 F, chills) or have any questions regarding your home care instructions, please contact the wound center at [phone]. If after hours, contact your primary care physician or go to the hospital emergency room.

## 2024-05-20 NOTE — PROGRESS NOTES
Brief Wound Care Provider Note    Patient with venous insufficiency and was seen by [facility], considering venous ablation. She would like second opinion. Will place referral to Dr. Palacios for evaluation for possible venous ablation.

## 2024-05-20 NOTE — WOUND TEAM
[facility]   [facility]   [address]   [address]   [place] [address]   [phone] Fax: [phone]        Duration of Supply Order: 90 Days  Dispense as written.    [facility]       Wound 12/29/23 Ankle Medial Right (Active)   Wound Image   05/20/24 1000   Site Assessment Scabbed 05/20/24 1000   Periwound Assessment Hemosiderin Staining;Edema;Intact 05/20/24 1000   Margins Attached edges 05/20/24 1000   Closure Secondary intention 03/08/24 0930   Drainage Amount None 05/20/24 1000   Drainage Description Serosanguineous 05/13/24 1100   Treatments Cleansed 05/20/24 1000   Wound Cleansing Hypochlorus Acid 05/20/24 1000   Periwound Protectant No-sting Skin Prep 05/20/24 1000   Dressing Status Old drainage 04/22/24 1100   Dressing Changed Changed 05/13/24 1100   Dressing Cleansing/Solutions Not Applicable 05/20/24 1000   Dressing Options Silicone Adhesive Foam;Pt's own compression sock and solaris wrap. 05/20/24 1000   Dressing Change/Treatment Frequency Every 48 hrs, and As Needed 05/20/24 1000   Wound Team Following Bi-Weekly 05/20/24 1000   Wound Length (cm) 0.5 cm 05/06/24 1530   Wound Width (cm) 0.5 cm 05/06/24 1530   Wound Depth (cm) 0.2 cm 05/06/24 1530   Wound Surface Area (cm^2) 0.25 cm^2 05/06/24 1530   Wound Volume (cm^3) 0.05 cm^3 05/06/24 1530   Post-Procedure Length (cm) 0.5 cm 05/13/24 1100   Post-Procedure Width (cm) 0.5 cm 05/13/24 1100   Post-Procedure Depth (cm) 0.3 cm 05/13/24 1100   Post-Procedure Surface Area (cm^2) 0.25 cm^2 05/13/24 1100   Post-Procedure Volume (cm^3) 0.075 cm^3 05/13/24 1100   Wound Healing % 95 05/06/24 1530   Tunneling (cm) 0 cm 05/13/24 1100   Undermining (cm) 0 cm 05/13/24 1100   Wound Odor None 05/20/24 1000   Pulses Right;Left;DP;PT;Doppler;Other (Comments) 12/29/23 1000   Exposed Structures None 05/20/24 1000       Wound 04/29/24 Leg Distal;Lower;Medial Right (Active)   Wound Image   05/20/24 1000   Site Assessment  Red;Yellow;Painful 05/20/24 1000   Periwound Assessment Hemosiderin Staining;Edema;Intact 05/20/24 1000   Margins Attached edges 05/20/24 1000   Drainage Amount Moderate 05/20/24 1000   Drainage Description Yellow 05/20/24 1000   Treatments Cleansed;Nonselective debridement 05/20/24 1000   Wound Cleansing Hypochlorus Acid 05/20/24 1000   Periwound Protectant No-sting Skin Prep 05/20/24 1000   Dressing Changed Changed 05/20/24 1000   Dressing Cleansing/Solutions Not Applicable 05/20/24 1000   Dressing Options Honey Gel;Silicone Adhesive Foam, Pt's own compression sock and solaris wrap. 05/20/24 1000   Dressing Change/Treatment Frequency Every 48 hrs, and As Needed 05/20/24 1000   Wound Team Following Bi-Weekly 05/20/24 1000   Non-staged Wound Description Full thickness 05/20/24 1000   Wound Length (cm) 0.6 cm 05/13/24 1100   Wound Width (cm) 1 cm 05/13/24 1100   Wound Depth (cm) 0.1 cm 05/13/24 1100   Wound Surface Area (cm^2) 0.6 cm^2 05/13/24 1100   Wound Volume (cm^3) 0.06 cm^3 05/13/24 1100   Post-Procedure Length (cm) 1 cm 05/20/24 1000   Post-Procedure Width (cm) 1.1 cm 05/20/24 1000   Post-Procedure Depth (cm) 0.1 cm 05/20/24 1000   Post-Procedure Surface Area (cm^2) 1.1 cm^2 05/20/24 1000   Post-Procedure Volume (cm^3) 0.11 cm^3 05/20/24 1000   Wound Healing % -1400 05/13/24 1100   Tunneling (cm) 0 cm 05/20/24 1000   Undermining (cm) 0 cm 05/20/24 1000   Wound Odor None 05/20/24 1000   Exposed Structures None 05/20/24 1000

## 2024-06-07 ENCOUNTER — OFFICE VISIT (OUTPATIENT)
Dept: WOUND CARE | Facility: MEDICAL CENTER | Age: 79
End: 2024-06-07
Attending: FAMILY MEDICINE
Payer: MEDICARE

## 2024-06-07 DIAGNOSIS — T14.8XXA PAIN ASSOCIATED WITH WOUND: ICD-10-CM

## 2024-06-07 DIAGNOSIS — L97.312 VENOUS STASIS ULCER OF RIGHT ANKLE WITH FAT LAYER EXPOSED WITH VARICOSE VEINS (HCC): ICD-10-CM

## 2024-06-07 DIAGNOSIS — R52 PAIN ASSOCIATED WITH WOUND: ICD-10-CM

## 2024-06-07 DIAGNOSIS — B35.1 MYCOTIC TOENAILS: ICD-10-CM

## 2024-06-07 DIAGNOSIS — M20.41 HAMMERTOES OF BOTH FEET: ICD-10-CM

## 2024-06-07 DIAGNOSIS — I83.013 VENOUS STASIS ULCER OF RIGHT ANKLE WITH FAT LAYER EXPOSED WITH VARICOSE VEINS (HCC): ICD-10-CM

## 2024-06-07 DIAGNOSIS — M20.42 HAMMERTOES OF BOTH FEET: ICD-10-CM

## 2024-06-07 DIAGNOSIS — R60.0 EDEMA OF BOTH LOWER LEGS: ICD-10-CM

## 2024-06-07 PROCEDURE — 11042 DBRDMT SUBQ TIS 1ST 20SQCM/<: CPT | Performed by: NURSE PRACTITIONER

## 2024-06-07 PROCEDURE — 11042 DBRDMT SUBQ TIS 1ST 20SQCM/<: CPT

## 2024-06-07 NOTE — PATIENT INSTRUCTIONS
-Keep your wound dressing clean, dry, and intact.     -Change your dressing every 48 to 72 hours, and if it becomes soiled, soaked, or falls off.    -Should you experience any significant changes in your wound(s), such as infection (redness, swelling, localized heat, increased pain, fever > 101 F, chills) or have any questions regarding your home care instructions, please contact the wound center at [phone]. If after hours, contact your primary care physician or go to the hospital emergency room.

## 2024-06-07 NOTE — PROGRESS NOTES
Provider Encounter- Lower Extremity Ulcer      HISTORY OF PRESENT ILLNESS  Wound History:    START OF CARE IN CLINIC: 12/29/2023               REFERRING PROVIDER: Nils Frausto MD                  WOUND ETIOLOGY: venous insufficiency              LOCATION: right medial ankle     Right distal lower leg satellite ulcer-noted 4/22/2024                 HISTORY: Patient with long history of CVI referred to the clinic for treatment of a right medial ankle ulcer.  This wound started in October of this year, patient feels this was caused by rubbing from her sock and shoes.  She has a history of similar ulcers to her left lower extremity.  She has undergone multiple previous venous procedures, including ablations, to both legs in the past, has been seen at [facility].   Her PCP prescribed 2 courses of clindamycin.           Pertinent Medical History: Chronic venous insufficiency, atrial fibrillation      ETIOLOGY HISTORY: Diagnosed unknown. Vascular Surgeon: Dr. Goldstein. Previous vascular procedures ablation procedures. Compression stockings 20 to 40 mmHg. Varicose Veins present        TOBACCO USE: Former smoker, quit in 1972    Patient's problem list, allergies, and current medications reviewed and updated in Epic    Interval History:  1/5/2024 : Initial provider visit with ADRIANA Chi, RYAN, CWMATEON, CFCN.  Patient is here today with her .  She states that overall she is feeling well.  Tolerating compression wrap without any difficulty.  Wound measures smaller today.  She has completed her antibiotics.      1/15/2024: Clinic visit with Claudia WRIGHT, RYAN, WILVER, CFCN.  Pt denies fevers, chills, nausea, vomiting.  Has not received Solaris wrap for right leg.  Followed by [place] [facility].  Has had history of ablation to right thigh and knee but never to right ankle.  Ulcer has decreased in size to right medial ankle.  Hammertoes bilaterally with mycotic overgrown toenails with callusing.   Referral to podiatry placed.  Discussed with patient following up with local orthotist for custom inserts.  Patient does not have diabetes.    1/22/2024:  Clinic visit with RYAN March CWON, CFCN.  Pt denies fevers, chills, nausea, vomiting.  Accompanied by .  Ulcer has decreased in size.  Solaris wrap at bedside.  Will transition patient into Solaris compression wrap today.  Toenails have been trimmed in the meantime by .  Reported ladi to right third toe, no ulceration today.  There is thin scab.  Podiatry referral was placed last visit.  Patient has not had a chance to follow-up with orthotic company.   local orthotic clinic information given to patient.  Patient additionally has Achilles tightness.  Recommend PT.  Patient/family would like to discuss this further prior to referral placement.    3/29/24: Clinic visit with RYAN March CWON, CFCN.  Pt denies fevers, chills, nausea, vomiting. Only wearing  from solaris to RLE. Wearing 20-30mmhg compression sock to LLE.  Right medial ankle ulcer with dried exudate, crust forming.  Jozef at wound edges.    4/22/24: Clinic visit with RYAN March CWON, CFCN.  Pt denies fevers, chills, nausea, vomiting.  Accompanied by .  Ulcer has decreased in area.  Unfortunately she does present with a new satellite ulcer proximal from Tegaderm that was placed to allow patient to shower comfortably without removing dressing.  She does have increased edema to the periwound and CHOLO skin.  She is wearing Solaris compression garment however area does not cover the ankle.      4/29/2024: Clinic visit with Randy Spence MD. Patient reports doing ok. Now with two ulcers, one previously secondary to tegaderm. They report minimal drainage this week, have been using collagen which appears to have dried on wound bed and trapped fluid under desiccated collagen. She did not tolerate hydrofera  blue ready she reports caused pressure on wounds under compression. Will trial contact layer and gauze to avoid adhesives. She continues to wear own compression socks 20-30 or Solaris compression garments.    5/13/24: Clinic visit with RYAN March, WILVER, JUANPABLO.  Pt denies fevers, chills, nausea, vomiting.  Both ulcers have decreased slightly.  Slough less adherent with use of honey.  Wearing Solaris but reports feel loose.  Not able to wear Solaris wrap over ankle wounds as the edge causes pressure to the wound.  Add Tubigrip with compression liner.    6/7/24: Clinic visit with RYAN March, WILVER, JUANPABLO.  Pt denies fevers, chills, nausea, vomiting. Will see Dr. Palacios 6/10/24.  Accompanied by .  Had increased edema around 5/27/2024, was unable to apply her compression.  Ulcer has increased to right lower leg, unchanged to right medial ankle. Continues to wear personal compression   Socks      REVIEW OF SYSTEMS:   Unchanged from previous wound clinic assessment on 5/13/24, except as noted in interval history above    PHYSICAL EXAMINATION:   There were no vitals taken for this visit.    Physical Exam  Cardiovascular:      Rate and Rhythm: Bradycardia present.      Comments: Right DP +2, right PT +1 biphasic by Doppler  Pulmonary:      Effort: Pulmonary effort is normal.   Musculoskeletal:      Right lower leg: Edema present.      Left lower leg: Edema present.      Comments: Compression stocking to BLE  Hammertoes bilaterally  Achilles tightening bilaterally   Skin:     Comments: Full-thickness ulcer to right medial ankle-thick slough, area unchanged, no evidence of infection.    Satellite ulcer right anterior lower leg: Significantly increased in area, more diffuse, adherent thick slough, no evidence of infection.    Significant hemosiderin staining, atrophie ibis   Neurological:      Mental Status: She is alert and oriented to person, place, and time.   Psychiatric:          Mood and Affect: Mood normal.         WOUND ASSESSMENT  Wound 12/29/23 Ankle Medial Right (Active)   Wound Image    06/07/24 1500   Site Assessment Yellow 06/07/24 1500   Periwound Assessment Intact;Hemosiderin Staining;Edema 06/07/24 1500   Margins Attached edges 06/07/24 1500   Closure Secondary intention 03/08/24 0930   Drainage Amount Small 06/07/24 1500   Drainage Description Serosanguineous 06/07/24 1500   Treatments Cleansed;Topical Lidocaine;Provider debridement 06/07/24 1500   Wound Cleansing Hypochlorus Acid 06/07/24 1500   Periwound Protectant Skin Protectant Wipes to Periwound;Barrier Paste 06/07/24 1500   Dressing Status Old drainage 04/22/24 1100   Dressing Changed Changed 05/13/24 1100   Dressing Cleansing/Solutions Not Applicable 06/07/24 1500   Dressing Options Honey Gel;Hydrofiber;Silicone Adhesive Foam;Other (Comments) 06/07/24 1500   Dressing Change/Treatment Frequency Every 72 hrs, and As Needed 06/07/24 1500   Wound Team Following Bi-Weekly 06/07/24 1500   Wound Length (cm) 0.5 cm 06/07/24 1500   Wound Width (cm) 0.4 cm 06/07/24 1500   Wound Depth (cm) 0.2 cm 05/06/24 1530   Wound Surface Area (cm^2) 0.2 cm^2 06/07/24 1500   Wound Volume (cm^3) 0.05 cm^3 05/06/24 1530   Post-Procedure Length (cm) 0.5 cm 06/07/24 1500   Post-Procedure Width (cm) 0.5 cm 06/07/24 1500   Post-Procedure Depth (cm) 0.2 cm 06/07/24 1500   Post-Procedure Surface Area (cm^2) 0.25 cm^2 06/07/24 1500   Post-Procedure Volume (cm^3) 0.05 cm^3 06/07/24 1500   Wound Healing % 95 05/06/24 1530   Tunneling (cm) 0 cm 06/07/24 1500   Undermining (cm) 0 cm 06/07/24 1500   Wound Odor None 06/07/24 1500   Exposed Structures None 06/07/24 1500   Number of days: 161       Wound 04/29/24 Leg Distal;Lower;Medial Right (Active)   Wound Image    06/07/24 1500   Site Assessment Yellow;Painful 06/07/24 1500   Periwound Assessment Intact;Edema;Hemosiderin Staining 06/07/24 1500   Margins Attached edges 06/07/24 1500   Drainage Amount  Moderate 06/07/24 1500   Drainage Description Serosanguineous 06/07/24 1500   Treatments Cleansed;Topical Lidocaine;Provider debridement 06/07/24 1500   Wound Cleansing Hypochlorus Acid 06/07/24 1500   Periwound Protectant Skin Protectant Wipes to Periwound;Barrier Paste 06/07/24 1500   Dressing Changed Changed 06/07/24 1500   Dressing Cleansing/Solutions Not Applicable 06/07/24 1500   Dressing Options Honey Gel;Hydrofiber;Silicone Adhesive Foam;Other (Comments) 06/07/24 1500   Dressing Change/Treatment Frequency Every 72 hrs, and As Needed 06/07/24 1500   Wound Team Following Bi-Weekly 06/07/24 1500   Non-staged Wound Description Full thickness 06/07/24 1500   Wound Length (cm) 1 cm 06/07/24 1500   Wound Width (cm) 1.1 cm 06/07/24 1500   Wound Depth (cm) 0.1 cm 05/13/24 1100   Wound Surface Area (cm^2) 1.1 cm^2 06/07/24 1500   Wound Volume (cm^3) 0.06 cm^3 05/13/24 1100   Post-Procedure Length (cm) 2.5 cm 06/07/24 1500   Post-Procedure Width (cm) 2.1 cm 06/07/24 1500   Post-Procedure Depth (cm) 0.2 cm 06/07/24 1500   Post-Procedure Surface Area (cm^2) 5.25 cm^2 06/07/24 1500   Post-Procedure Volume (cm^3) 1.05 cm^3 06/07/24 1500   Wound Healing % -1400 05/13/24 1100   Tunneling (cm) 0 cm 06/07/24 1500   Undermining (cm) 0 cm 06/07/24 1500   Wound Odor None 06/07/24 1500   Exposed Structures None 06/07/24 1500   Number of days: 39       PROCEDURE: Excisional debridement of right proximal and distal medial ankle wounds  -2% viscous lidocaine applied topically to wound bed for approximately 5 minutes prior to debridement  -Curette used to debride wound bed.  Excisional debridement was performed to remove devitalized tissue until healthy, bleeding tissue was visualized.   Entire surface of wounds, 5.5 cm2 debrided.  Tissue debrided into the subcutaneous layer.    -Bleeding controlled with manual pressure.    -Wound care completed by wound RN, refer to flowsheet  -Patient tolerated the procedure well, without c/o pain  "or discomfort.        Pertinent Labs and Diagnostics:    Labs:   A1c:   Lab Results   Component Value Date/Time    HBA1C 5.9 (H) 04/26/2023 09:34 AM            IMAGING: None found in epic    VASCULAR STUDIES: None found in epic  No results found.    LAST  WOUND CULTURE:  DATE :  No results found for: \"CULTRSULT\"           ASSESSMENT AND PLAN:     1. Venous stasis ulcer of right ankle with fat layer exposed with varicose veins (HCC)    6/7/2024: Medial lower leg ulcer has increased, more diffuse, ankle ulcer unchanged.  Continues to have thick slough  - Excisional debridement performed today.  Medically necessary for wound healing  -Reports Solaris compression garment is loose.  Unable to get new compression garment until approximately July 2024 as last garment was ordered in January 2024  -Continue to wear 20 to 30 mmHg compression socks that she has. Discussed previously with patient the importance of wearing therapeutic compression.    - If ulcer does not improve, patient aware will transition into 2 layer compression wrap in future.   - Collagen desiccated on wound bed and trapping fluid.  Discontinued.  She did not tolerate hydrofera blue ready.  -Patient traveling from [place] no longer able to follow-up weekly.  Patient following up every other week for assessment and debridement  -Patient to change dressing 2-3 times per week to allow her to shower.    Wound care: Honey gel, Hydrofiber, adhesive foam, personal compression sock    2. Pain associated with wound    6/7/24: Improved  Minimal tenderness with excisional debridement.  Tolerated with 2% viscous lidocaine.    3. Edema of both lower legs    6/7/2024: Now wearing bilateral compression stockings.  Previously was wearing her compression liner and Solaris Velcro wrap, however Velcro wrap was irritating wound.  Tubigrip was recommended to put over compression liner to increase compression to the periwound.  Unknown if patient did this.    - Not able to " receive new Solaris compression wrap until July 2024.  Recommend ordering foot piece as well.  - Established with [facility].  Reports that MD plans to perform vein ablations to LLE.  No plans for RLE at this time  -Patient requested second opinion last month regarding her veins.  Referral was placed to Dr. Palacios.  Patient has an appointment with surgeon on 6/10/2024.    4. Mycotic toenails  5. Hammertoes of both feet    6/7/2024: Saw Dr. Juarez for foot and nail care previously.  Podiatrist okay with  to continue to perform nail care.  Previously given information about orthotics.  Unknown if she has obtained orthotics yet.    PATIENT EDUCATION  - Etiology of  venous stasis ulceration discussed with patient  - Importance of managing edema for healing of ulcer, and for prevention of new ulcer development  -Need for lifelong compression of lower legs   -Elevate legs above the level of the heart periodically throughout the day.  - Importance of adequate nutrition for wound healing  -Advised to go to ER for any increased redness, swelling, drainage or odor, or if patient develops fever, chills, nausea or vomiting.    Please note that this note may have been created using voice recognition software. I have worked with technical experts from [employer] [facility] to optimize the interface.  I have made every reasonable attempt to correct obvious errors, but there may be errors of grammar and possibly     N

## 2024-06-17 ENCOUNTER — NON-PROVIDER VISIT (OUTPATIENT)
Dept: WOUND CARE | Facility: MEDICAL CENTER | Age: 79
End: 2024-06-17
Attending: FAMILY MEDICINE
Payer: MEDICARE

## 2024-06-17 PROCEDURE — 97597 DBRDMT OPN WND 1ST 20 CM/<: CPT

## 2024-06-17 NOTE — PATIENT INSTRUCTIONS
Avoid prolonged standing or sitting without elevating your legs.  - Apply tubigrip to your legs ending 2 fingers below back of knee without wrinkles.   - Knee-high gradient compression to legs  - Multilayer compression wrap to right leg. Do not get wet and keep on for the week. Only remove if temperature or sensation changes.   If compression needs to be removed, un-wrap it do not cut it off.     Should you experience any significant changes in your wound(s), such as infection (redness, swelling, localized heat, increased pain, fever > 101 F, chills) or have any questions regarding your home care instructions, please contact the wound center at [phone]. If after hours, contact your primary care physician or go to the hospital emergency room.   Keep dressing clean, dry and covered while bathing. Only change dressing if it becomes over saturated, soiled or falls off.

## 2024-06-17 NOTE — PROGRESS NOTES
CSWD after application of topical lidocaine to right medial lower leg & ankle to remove non-viable slough of<20cm2. Changed dressing to absorptive that will last 1 week (patient lives in [place] & can only come Mondays) as trying 4 layer compression wrap to provide more compression. Patient had only been wearing her large thigh high 20 mmHg stockings and wounds have increased with more satellite lesions of periwound skin scar tissue breakdown. Patient interested in home health & will ask her PCP who she is seeing this week. Patient followed up with [facility] 6/10/24 and is planning for a laser ablation.

## 2024-06-25 ENCOUNTER — NON-PROVIDER VISIT (OUTPATIENT)
Dept: WOUND CARE | Facility: MEDICAL CENTER | Age: 79
End: 2024-06-25
Attending: FAMILY MEDICINE
Payer: MEDICARE

## 2024-06-25 PROCEDURE — 97602 WOUND(S) CARE NON-SELECTIVE: CPT

## 2024-06-25 NOTE — PROGRESS NOTES
Nonselective debridement of RLE Medial wounds to remove non-viable slough. Changed dressing to absorptive that will last 1 week (patient lives in [place] & can only come Mondays) Pt unable to tolerate 4 layer compression wrap from last visit and removed dressing after 2 days. Applied own compression stocking and solaris at that time. Patient to follow up with [facility] planning for laser ablation.

## 2024-06-25 NOTE — PATIENT INSTRUCTIONS
-Keep your wound dressing clean, dry, and intact. Change your dressing if it becomes soiled, soaked, or falls off.    -Remove your compression wrap if you have severe pain, severe swelling, numbness, color change, or temperature change in your toes. If you need to remove your compression wrap, do so by unrolling it. Do not cut the compression wrap off to prevent cutting yourself on accident.    -Should you experience any significant changes in your wound(s), such as infection (redness, swelling, localized heat, increased pain, fever > 101 F, chills) or have any questions regarding your home care instructions, please contact the wound center at [phone]. If after hours, contact your primary care physician or go to the hospital emergency room.

## 2024-07-01 ENCOUNTER — HOSPITAL ENCOUNTER (OUTPATIENT)
Facility: MEDICAL CENTER | Age: 79
End: 2024-07-01
Attending: NURSE PRACTITIONER
Payer: MEDICARE

## 2024-07-01 ENCOUNTER — OFFICE VISIT (OUTPATIENT)
Dept: WOUND CARE | Facility: MEDICAL CENTER | Age: 79
End: 2024-07-01
Attending: FAMILY MEDICINE
Payer: MEDICARE

## 2024-07-01 VITALS
TEMPERATURE: 97.8 F | DIASTOLIC BLOOD PRESSURE: 70 MMHG | HEART RATE: 57 BPM | RESPIRATION RATE: 18 BRPM | OXYGEN SATURATION: 93 % | SYSTOLIC BLOOD PRESSURE: 118 MMHG

## 2024-07-01 DIAGNOSIS — I83.013 VENOUS STASIS ULCER OF RIGHT ANKLE WITH FAT LAYER EXPOSED WITH VARICOSE VEINS (HCC): ICD-10-CM

## 2024-07-01 DIAGNOSIS — M20.41 HAMMERTOES OF BOTH FEET: ICD-10-CM

## 2024-07-01 DIAGNOSIS — R52 PAIN ASSOCIATED WITH WOUND: ICD-10-CM

## 2024-07-01 DIAGNOSIS — L97.312 VENOUS STASIS ULCER OF RIGHT ANKLE WITH FAT LAYER EXPOSED WITH VARICOSE VEINS (HCC): ICD-10-CM

## 2024-07-01 DIAGNOSIS — M20.42 HAMMERTOES OF BOTH FEET: ICD-10-CM

## 2024-07-01 DIAGNOSIS — T14.8XXA PAIN ASSOCIATED WITH WOUND: ICD-10-CM

## 2024-07-01 DIAGNOSIS — R60.0 EDEMA OF BOTH LOWER LEGS: ICD-10-CM

## 2024-07-01 DIAGNOSIS — B35.1 MYCOTIC TOENAILS: ICD-10-CM

## 2024-07-01 PROCEDURE — 99213 OFFICE O/P EST LOW 20 MIN: CPT

## 2024-07-01 PROCEDURE — 3078F DIAST BP <80 MM HG: CPT | Performed by: NURSE PRACTITIONER

## 2024-07-01 PROCEDURE — 87077 CULTURE AEROBIC IDENTIFY: CPT | Mod: 91

## 2024-07-01 PROCEDURE — 11042 DBRDMT SUBQ TIS 1ST 20SQCM/<: CPT

## 2024-07-01 PROCEDURE — 87186 SC STD MICRODIL/AGAR DIL: CPT

## 2024-07-01 PROCEDURE — 3074F SYST BP LT 130 MM HG: CPT | Performed by: NURSE PRACTITIONER

## 2024-07-01 PROCEDURE — 87070 CULTURE OTHR SPECIMN AEROBIC: CPT

## 2024-07-01 PROCEDURE — 87205 SMEAR GRAM STAIN: CPT

## 2024-07-01 PROCEDURE — 99213 OFFICE O/P EST LOW 20 MIN: CPT | Mod: 25 | Performed by: NURSE PRACTITIONER

## 2024-07-01 PROCEDURE — 11042 DBRDMT SUBQ TIS 1ST 20SQCM/<: CPT | Performed by: NURSE PRACTITIONER

## 2024-07-01 RX ORDER — SILICONE ADHESIVE 1.5" X 3"
1 SHEET (EA) TOPICAL 3 TIMES DAILY
COMMUNITY

## 2024-07-02 LAB
GRAM STN SPEC: NORMAL
SIGNIFICANT IND 70042: NORMAL
SITE SITE: NORMAL
SOURCE SOURCE: NORMAL

## 2024-07-04 LAB
BACTERIA WND AEROBE CULT: ABNORMAL
GRAM STN SPEC: ABNORMAL
SIGNIFICANT IND 70042: ABNORMAL
SITE SITE: ABNORMAL
SOURCE SOURCE: ABNORMAL

## 2024-07-05 DIAGNOSIS — I83.013 VENOUS STASIS ULCER OF RIGHT ANKLE WITH FAT LAYER EXPOSED WITH VARICOSE VEINS (HCC): ICD-10-CM

## 2024-07-05 DIAGNOSIS — T14.8XXA WOUND INFECTION: ICD-10-CM

## 2024-07-05 DIAGNOSIS — L97.312 VENOUS STASIS ULCER OF RIGHT ANKLE WITH FAT LAYER EXPOSED WITH VARICOSE VEINS (HCC): ICD-10-CM

## 2024-07-05 DIAGNOSIS — L08.9 WOUND INFECTION: ICD-10-CM

## 2024-07-05 RX ORDER — CEFDINIR 300 MG/1
300 CAPSULE ORAL 2 TIMES DAILY
Qty: 20 CAPSULE | Refills: 0 | Status: SHIPPED | OUTPATIENT
Start: 2024-07-05 | End: 2024-07-11

## 2024-07-05 RX ORDER — LINEZOLID 600 MG/1
600 TABLET, FILM COATED ORAL 2 TIMES DAILY
Qty: 20 TABLET | Refills: 0 | Status: SHIPPED | OUTPATIENT
Start: 2024-07-05 | End: 2024-07-15

## 2024-07-08 ENCOUNTER — NON-PROVIDER VISIT (OUTPATIENT)
Dept: WOUND CARE | Facility: MEDICAL CENTER | Age: 79
End: 2024-07-08
Attending: FAMILY MEDICINE
Payer: MEDICARE

## 2024-07-08 PROCEDURE — 97597 DBRDMT OPN WND 1ST 20 CM/<: CPT

## 2024-07-11 ENCOUNTER — HOSPITAL ENCOUNTER (OUTPATIENT)
Dept: LAB | Facility: MEDICAL CENTER | Age: 79
End: 2024-07-11
Attending: NURSE PRACTITIONER
Payer: MEDICARE

## 2024-07-11 ENCOUNTER — TELEPHONE (OUTPATIENT)
Dept: WOUND CARE | Facility: MEDICAL CENTER | Age: 79
End: 2024-07-11
Payer: MEDICARE

## 2024-07-11 ENCOUNTER — DOCUMENTATION (OUTPATIENT)
Dept: WOUND CARE | Facility: MEDICAL CENTER | Age: 79
End: 2024-07-11
Payer: MEDICARE

## 2024-07-11 DIAGNOSIS — L97.312 VENOUS STASIS ULCER OF RIGHT ANKLE WITH FAT LAYER EXPOSED WITH VARICOSE VEINS (HCC): ICD-10-CM

## 2024-07-11 DIAGNOSIS — L08.9 WOUND INFECTION: ICD-10-CM

## 2024-07-11 DIAGNOSIS — T14.8XXA WOUND INFECTION: ICD-10-CM

## 2024-07-11 DIAGNOSIS — I83.013 VENOUS STASIS ULCER OF RIGHT ANKLE WITH FAT LAYER EXPOSED WITH VARICOSE VEINS (HCC): ICD-10-CM

## 2024-07-11 LAB
ALBUMIN SERPL BCP-MCNC: 4.1 G/DL (ref 3.2–4.9)
ALBUMIN/GLOB SERPL: 1.5 G/DL
ALP SERPL-CCNC: 92 U/L (ref 30–99)
ALT SERPL-CCNC: 12 U/L (ref 2–50)
ANION GAP SERPL CALC-SCNC: 11 MMOL/L (ref 7–16)
AST SERPL-CCNC: 14 U/L (ref 12–45)
BASOPHILS # BLD AUTO: 1.1 % (ref 0–1.8)
BASOPHILS # BLD: 0.06 K/UL (ref 0–0.12)
BILIRUB SERPL-MCNC: 0.5 MG/DL (ref 0.1–1.5)
BUN SERPL-MCNC: 11 MG/DL (ref 8–22)
CALCIUM ALBUM COR SERPL-MCNC: 9.4 MG/DL (ref 8.5–10.5)
CALCIUM SERPL-MCNC: 9.5 MG/DL (ref 8.5–10.5)
CHLORIDE SERPL-SCNC: 106 MMOL/L (ref 96–112)
CO2 SERPL-SCNC: 27 MMOL/L (ref 20–33)
CREAT SERPL-MCNC: 0.53 MG/DL (ref 0.5–1.4)
EOSINOPHIL # BLD AUTO: 0.34 K/UL (ref 0–0.51)
EOSINOPHIL NFR BLD: 6.1 % (ref 0–6.9)
ERYTHROCYTE [DISTWIDTH] IN BLOOD BY AUTOMATED COUNT: 46.7 FL (ref 35.9–50)
GFR SERPLBLD CREATININE-BSD FMLA CKD-EPI: 94 ML/MIN/1.73 M 2
GLOBULIN SER CALC-MCNC: 2.7 G/DL (ref 1.9–3.5)
GLUCOSE SERPL-MCNC: 103 MG/DL (ref 65–99)
HCT VFR BLD AUTO: 45.6 % (ref 37–47)
HGB BLD-MCNC: 14.1 G/DL (ref 12–16)
IMM GRANULOCYTES # BLD AUTO: 0.02 K/UL (ref 0–0.11)
IMM GRANULOCYTES NFR BLD AUTO: 0.4 % (ref 0–0.9)
LYMPHOCYTES # BLD AUTO: 1.83 K/UL (ref 1–4.8)
LYMPHOCYTES NFR BLD: 33.1 % (ref 22–41)
MCH RBC QN AUTO: 29.2 PG (ref 27–33)
MCHC RBC AUTO-ENTMCNC: 30.9 G/DL (ref 32.2–35.5)
MCV RBC AUTO: 94.4 FL (ref 81.4–97.8)
MONOCYTES # BLD AUTO: 0.48 K/UL (ref 0–0.85)
MONOCYTES NFR BLD AUTO: 8.7 % (ref 0–13.4)
NEUTROPHILS # BLD AUTO: 2.8 K/UL (ref 1.82–7.42)
NEUTROPHILS NFR BLD: 50.6 % (ref 44–72)
NRBC # BLD AUTO: 0 K/UL
NRBC BLD-RTO: 0 /100 WBC (ref 0–0.2)
PLATELET # BLD AUTO: 223 K/UL (ref 164–446)
PMV BLD AUTO: 9.4 FL (ref 9–12.9)
POTASSIUM SERPL-SCNC: 4.5 MMOL/L (ref 3.6–5.5)
PROT SERPL-MCNC: 6.8 G/DL (ref 6–8.2)
RBC # BLD AUTO: 4.83 M/UL (ref 4.2–5.4)
SODIUM SERPL-SCNC: 144 MMOL/L (ref 135–145)
WBC # BLD AUTO: 5.5 K/UL (ref 4.8–10.8)

## 2024-07-11 PROCEDURE — 80053 COMPREHEN METABOLIC PANEL: CPT

## 2024-07-11 PROCEDURE — 85025 COMPLETE CBC W/AUTO DIFF WBC: CPT

## 2024-07-11 PROCEDURE — 36415 COLL VENOUS BLD VENIPUNCTURE: CPT

## 2024-07-11 RX ORDER — MINOCYCLINE HYDROCHLORIDE 100 MG/1
100 CAPSULE ORAL 2 TIMES DAILY
Qty: 20 CAPSULE | Refills: 0 | Status: SHIPPED | OUTPATIENT
Start: 2024-07-11 | End: 2024-07-21

## 2024-07-15 ENCOUNTER — OFFICE VISIT (OUTPATIENT)
Dept: WOUND CARE | Facility: MEDICAL CENTER | Age: 79
End: 2024-07-15
Attending: FAMILY MEDICINE
Payer: MEDICARE

## 2024-07-15 VITALS
TEMPERATURE: 96.6 F | RESPIRATION RATE: 18 BRPM | SYSTOLIC BLOOD PRESSURE: 99 MMHG | DIASTOLIC BLOOD PRESSURE: 56 MMHG | OXYGEN SATURATION: 92 % | HEART RATE: 74 BPM

## 2024-07-15 DIAGNOSIS — B35.1 MYCOTIC TOENAILS: ICD-10-CM

## 2024-07-15 DIAGNOSIS — M20.41 HAMMERTOES OF BOTH FEET: ICD-10-CM

## 2024-07-15 DIAGNOSIS — I83.013 VENOUS STASIS ULCER OF RIGHT ANKLE WITH FAT LAYER EXPOSED WITH VARICOSE VEINS (HCC): ICD-10-CM

## 2024-07-15 DIAGNOSIS — L08.9 WOUND INFECTION: ICD-10-CM

## 2024-07-15 DIAGNOSIS — L97.312 VENOUS STASIS ULCER OF RIGHT ANKLE WITH FAT LAYER EXPOSED WITH VARICOSE VEINS (HCC): ICD-10-CM

## 2024-07-15 DIAGNOSIS — R60.0 EDEMA OF BOTH LOWER LEGS: ICD-10-CM

## 2024-07-15 DIAGNOSIS — M20.42 HAMMERTOES OF BOTH FEET: ICD-10-CM

## 2024-07-15 DIAGNOSIS — R52 PAIN ASSOCIATED WITH WOUND: ICD-10-CM

## 2024-07-15 DIAGNOSIS — T14.8XXA PAIN ASSOCIATED WITH WOUND: ICD-10-CM

## 2024-07-15 DIAGNOSIS — T14.8XXA WOUND INFECTION: ICD-10-CM

## 2024-07-15 PROCEDURE — 11042 DBRDMT SUBQ TIS 1ST 20SQCM/<: CPT | Performed by: STUDENT IN AN ORGANIZED HEALTH CARE EDUCATION/TRAINING PROGRAM

## 2024-07-15 PROCEDURE — 11042 DBRDMT SUBQ TIS 1ST 20SQCM/<: CPT

## 2024-07-15 PROCEDURE — 3074F SYST BP LT 130 MM HG: CPT | Performed by: STUDENT IN AN ORGANIZED HEALTH CARE EDUCATION/TRAINING PROGRAM

## 2024-07-15 PROCEDURE — 3078F DIAST BP <80 MM HG: CPT | Performed by: STUDENT IN AN ORGANIZED HEALTH CARE EDUCATION/TRAINING PROGRAM

## 2024-07-29 ENCOUNTER — NON-PROVIDER VISIT (OUTPATIENT)
Dept: WOUND CARE | Facility: MEDICAL CENTER | Age: 79
End: 2024-07-29
Attending: FAMILY MEDICINE
Payer: MEDICARE

## 2024-07-29 PROCEDURE — 97597 DBRDMT OPN WND 1ST 20 CM/<: CPT

## 2024-11-25 ENCOUNTER — HOSPITAL ENCOUNTER (OUTPATIENT)
Dept: LAB | Facility: MEDICAL CENTER | Age: 79
End: 2024-11-25
Attending: INTERNAL MEDICINE
Payer: MEDICARE

## 2024-11-25 LAB
ALBUMIN SERPL BCP-MCNC: 4.1 G/DL (ref 3.2–4.9)
ALBUMIN/GLOB SERPL: 1.5 G/DL
ALP SERPL-CCNC: 83 U/L (ref 30–99)
ALT SERPL-CCNC: 7 U/L (ref 2–50)
ANION GAP SERPL CALC-SCNC: 10 MMOL/L (ref 7–16)
AST SERPL-CCNC: 17 U/L (ref 12–45)
BILIRUB SERPL-MCNC: 0.5 MG/DL (ref 0.1–1.5)
BUN SERPL-MCNC: 15 MG/DL (ref 8–22)
CALCIUM ALBUM COR SERPL-MCNC: 8.9 MG/DL (ref 8.5–10.5)
CALCIUM SERPL-MCNC: 9 MG/DL (ref 8.5–10.5)
CHLORIDE SERPL-SCNC: 105 MMOL/L (ref 96–112)
CHOLEST SERPL-MCNC: 174 MG/DL (ref 100–199)
CO2 SERPL-SCNC: 27 MMOL/L (ref 20–33)
CREAT SERPL-MCNC: 0.43 MG/DL (ref 0.5–1.4)
DIGOXIN SERPL-MCNC: 0.6 NG/ML (ref 0.8–2)
EST. AVERAGE GLUCOSE BLD GHB EST-MCNC: 123 MG/DL
GFR SERPLBLD CREATININE-BSD FMLA CKD-EPI: 99 ML/MIN/1.73 M 2
GLOBULIN SER CALC-MCNC: 2.7 G/DL (ref 1.9–3.5)
GLUCOSE SERPL-MCNC: 102 MG/DL (ref 65–99)
HBA1C MFR BLD: 5.9 % (ref 4–5.6)
HDLC SERPL-MCNC: 68 MG/DL
LDLC SERPL CALC-MCNC: 99 MG/DL
POTASSIUM SERPL-SCNC: 4.3 MMOL/L (ref 3.6–5.5)
PROT SERPL-MCNC: 6.8 G/DL (ref 6–8.2)
SODIUM SERPL-SCNC: 142 MMOL/L (ref 135–145)
TRIGL SERPL-MCNC: 34 MG/DL (ref 0–149)

## 2024-11-25 PROCEDURE — 80162 ASSAY OF DIGOXIN TOTAL: CPT

## 2024-11-25 PROCEDURE — 36415 COLL VENOUS BLD VENIPUNCTURE: CPT

## 2024-11-25 PROCEDURE — 83036 HEMOGLOBIN GLYCOSYLATED A1C: CPT | Mod: GA

## 2024-11-25 PROCEDURE — 80053 COMPREHEN METABOLIC PANEL: CPT

## 2024-11-25 PROCEDURE — 80061 LIPID PANEL: CPT
